# Patient Record
Sex: FEMALE | Race: WHITE
[De-identification: names, ages, dates, MRNs, and addresses within clinical notes are randomized per-mention and may not be internally consistent; named-entity substitution may affect disease eponyms.]

---

## 2021-07-20 ENCOUNTER — HOSPITAL ENCOUNTER (INPATIENT)
Dept: HOSPITAL 41 - JD.ED | Age: 71
LOS: 2 days | Discharge: HOME HEALTH | DRG: 422 | End: 2021-07-22
Payer: COMMERCIAL

## 2021-07-20 DIAGNOSIS — Z51.5: ICD-10-CM

## 2021-07-20 DIAGNOSIS — R62.7: ICD-10-CM

## 2021-07-20 DIAGNOSIS — C34.91: ICD-10-CM

## 2021-07-20 DIAGNOSIS — G89.3: ICD-10-CM

## 2021-07-20 DIAGNOSIS — M19.90: ICD-10-CM

## 2021-07-20 DIAGNOSIS — C78.7: ICD-10-CM

## 2021-07-20 DIAGNOSIS — E86.0: Primary | ICD-10-CM

## 2021-07-20 DIAGNOSIS — Z20.822: ICD-10-CM

## 2021-07-20 DIAGNOSIS — Z66: ICD-10-CM

## 2021-07-20 DIAGNOSIS — C79.51: ICD-10-CM

## 2021-07-20 DIAGNOSIS — Z87.891: ICD-10-CM

## 2021-07-20 DIAGNOSIS — C79.31: ICD-10-CM

## 2021-07-20 DIAGNOSIS — H54.7: ICD-10-CM

## 2021-07-20 PROCEDURE — U0002 COVID-19 LAB TEST NON-CDC: HCPCS

## 2021-07-20 NOTE — PCM.HP.2
H&P History of Present Illness





- General


Date of Service: 07/20/21


Admit Problem/Dx: 


                           Admission Diagnosis/Problem





Admission Diagnosis/Problem      physically weak, unable to ambulate, lung 

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  The patient is not continuing 

chemotherapy








Source of Information: Patient, Family


History Limitations: Reports: No Limitations





- History of Present Illness


Initial Comments - Free Text/Narative: 





The patient is a 70-year-old lady who had presented to the emergency department 

essentially unable to care for herself.  She has not eaten or drank anything in 

3 days.  She does have an underlying history of lung cancer that has 

metastasized to her brain, spine and liver.  The patient has reported that 

nothing tastes good for her.  The patient has a chemotherapy appointment 

tomorrow that she canceled as she does not want any further chemotherapy or 

radiation treatment.  The patient also says that she has been too weak to use 

her walker at home.  Reportedly the patient's brain tumors have grown.  The 

patient has stopped taking all of her medications.  The patient reports that she

has mild back pain


Onset of Symptoms: Reports: Gradual


Location: Reports: Back


Quality: Reports: Ache


Severity: Mild


Improves with: Reports: None


Worsens with: Reports: None


Context: Reports: Other (Poor oral intake of food and fluids)


Associated Symptoms: Reports: Loss of Appetite





- Related Data


Allergies/Adverse Reactions: 


                                    Allergies











Allergy/AdvReac Type Severity Reaction Status Date / Time


 


No Known Allergies Allergy   Verified 07/20/21 12:35











Home Medications: 


                                    Home Meds





Non-Formulary Medication [NF Drug] 0 each .XX ASDIRECTED 07/20/21 [History]











Past Medical History


HEENT History: Reports: Impaired Vision


Cardiovascular History: Reports: None


Respiratory History: Reports: SOB


Gastrointestinal History: Reports: None


OB/GYN History: Reports: Endometriosis, Polycystic Ovaries


Musculoskeletal History: Reports: Osteoarthritis, Other (See Below) (Back pain 

felt to be due to metastatic bone disease)


Neurological History: Reports: Other (See Below) (Has known multiple metastatic 

brain lesions from primary lung cancer.  No history of a seizure event)


Endocrine/Metabolic History: Reports: None


Hematologic History: Reports: None


Immunologic History: Reports: None


Oncologic (Cancer) History: Reports: Brain (Metastatic disease to her brain 

diagnosed within the last 9 months treated with radiation.), Liver, Lung 

(Primary cancer was diagnosed in 2017 in the right lung.  She is this has been 

treated with radiation and chemotherapy and immunotherapy.  Last treatment was 3

 weeks ago), Metastatic (Imaging reveals advanced metastatic disease to brain 

and bone and liver.)


Other Oncologic History: right lung cancer diagnosed in 2017 that has spread to 

brain and spine





- Past Surgical History


Respiratory Surgical History: Reports: Lung Biopsies


GI Surgical History: Reports: Colonoscopy





Social & Family History





- Tobacco Use


Tobacco Use Status *Q: Former Tobacco User


Used Tobacco, but Quit: Yes


Month/Year Tobacco Last Used: 01/1999





- Caffeine Use


Caffeine Use: Reports: None





- Recreational Drug Use


Recreational Drug Use: No





- Living Situation & Occupation


Living situation: Reports: , with Spouse


Occupation: Disabled





H&P Review of Systems





- Review of Systems:


Review Of Systems: See Below


General: Reports: Weakness, Fatigue, Decreased Appetite, Weight Loss


HEENT: Reports: Other (Dryness)


Pulmonary: Reports: Shortness of Breath


Cardiovascular: Reports: No Symptoms


Gastrointestinal: Reports: Anorexia


Genitourinary: Reports: No Symptoms


Musculoskeletal: Reports: Back Pain (Metastatic cancer to the spine)


Skin: Reports: Dryness


Psychiatric: Reports: No Symptoms


Neurological: Reports: No Symptoms


Hematologic/Lymphatic: Reports: No Symptoms


Immunologic: Reports: No Symptoms





Exam





- Exam


Exam: See Below





- Vital Signs


Vital Signs: 


                                Last Vital Signs











Temp  36.4 C   07/20/21 12:30


 


Pulse  108 H  07/20/21 12:30


 


Resp  18   07/20/21 12:30


 


BP  107/67   07/20/21 12:30


 


Pulse Ox  91 L  07/20/21 12:30











Weight: 69.853 kg





- Exam


Quality Assessment: No: Supplemental Oxygen, DVT Prophylaxis


General: Alert, Oriented, Cooperative


HEENT: Conjunctiva Clear, EACs Clear, PERRLA.  No: Mucosa Moist & Pink (Very 

dry, poor dentition)


Neck: Supple, Trachea Midline.  No: Lymphadenopathy


Lungs: Decreased Breath Sounds, Rales (Widely scattered)


Cardiovascular: Regular Rate, Regular Rhythm, Normal S1, Normal S2


GI/Abdominal Exam: Normal Bowel Sounds, Soft, Non-Tender, No Distention


 (Female) Exam: Deferred


Rectal (Female) Exam: Deferred


Back Exam: Normal Inspection, Full Range of Motion


Extremities: Normal Inspection, Normal Range of Motion, Pedal Edema (+1)


Skin: Warm, Dry, Intact


Neurological: Cranial Nerves Intact, Normal Speech


Psychiatric: Alert, Normal Affect, Normal Mood





- Patient Data


Lab Results Last 24 hrs: 


                         Laboratory Results - last 24 hr











  07/20/21 07/20/21 07/20/21 Range/Units





  12:55 12:55 12:55 


 


WBC  11.60 H    (3.98-10.04)  K/mm3


 


RBC  3.73 L    (3.98-5.22)  M/mm3


 


Hgb  11.9    (11.2-15.7)  gm/dl


 


Hct  37.6    (34.1-44.9)  %


 


MCV  100.8 H    (79.4-94.8)  fl


 


MCH  31.9    (25.6-32.2)  pg


 


MCHC  31.6 L    (32.2-35.5)  g/dl


 


RDW Std Deviation  64.2 H    (36.4-46.3)  fL


 


Plt Count  239    (182-369)  K/mm3


 


MPV  10.2    (9.4-12.3)  fl


 


Neut % (Auto)  70.7    (34.0-71.1)  %


 


Lymph % (Auto)  23.8    (19.3-51.7)  %


 


Mono % (Auto)  4.9    (4.7-12.5)  %


 


Eos % (Auto)  0 L    (0.7-5.8)  


 


Baso % (Auto)  0.3    (0.1-1.2)  %


 


Neut # (Auto)  8.20 H    (1.56-6.13)  K/mm3


 


Lymph # (Auto)  2.76    (1.18-3.74)  K/mm3


 


Mono # (Auto)  0.57 H    (0.24-0.36)  K/mm3


 


Eos # (Auto)  0.00 L    (0.04-0.36)  K/mm3


 


Baso # (Auto)  0.04    (0.01-0.08)  K/mm3


 


PT   16.0 H   (9.7-12.0)  SECONDS


 


INR   1.51   


 


Sodium    141  (136-145)  mEq/L


 


Potassium    3.6  (3.5-5.1)  mEq/L


 


Chloride    103  ()  mEq/L


 


Carbon Dioxide    22  (21-32)  mEq/L


 


Anion Gap    19.6 H  (5-15)  


 


BUN    12  (7-18)  mg/dL


 


Creatinine    0.6  (0.55-1.02)  mg/dL


 


Est Cr Clr Drug Dosing    80.75  mL/min


 


Estimated GFR (MDRD)    > 60  (>60)  mL/min


 


BUN/Creatinine Ratio    20.0 H  (14-18)  


 


Glucose    87  (70-99)  mg/dL


 


Calcium    8.4 L  (8.5-10.1)  mg/dL


 


Magnesium    1.7 L  (1.8-2.4)  mg/dL


 


Total Bilirubin    0.7  (0.2-1.0)  mg/dL


 


GGT    41  (5-55)  U/L


 


AST    21  (15-37)  U/L


 


ALT    12 L  (14-59)  U/L


 


Alkaline Phosphatase    103  ()  U/L


 


C-Reactive Protein    0.6  (<1.0)  mg/dL


 


NT-Pro-B Natriuret Pep     (0-125)  pg/mL


 


Total Protein    5.5 L  (6.4-8.2)  g/dl


 


Albumin    2.3 L  (3.4-5.0)  g/dl


 


Globulin    3.2  gm/dL


 


Albumin/Globulin Ratio    0.7 L  (1-2)  


 


Lipase    79  ()  U/L














  07/20/21 Range/Units





  12:55 


 


WBC   (3.98-10.04)  K/mm3


 


RBC   (3.98-5.22)  M/mm3


 


Hgb   (11.2-15.7)  gm/dl


 


Hct   (34.1-44.9)  %


 


MCV   (79.4-94.8)  fl


 


MCH   (25.6-32.2)  pg


 


MCHC   (32.2-35.5)  g/dl


 


RDW Std Deviation   (36.4-46.3)  fL


 


Plt Count   (182-369)  K/mm3


 


MPV   (9.4-12.3)  fl


 


Neut % (Auto)   (34.0-71.1)  %


 


Lymph % (Auto)   (19.3-51.7)  %


 


Mono % (Auto)   (4.7-12.5)  %


 


Eos % (Auto)   (0.7-5.8)  


 


Baso % (Auto)   (0.1-1.2)  %


 


Neut # (Auto)   (1.56-6.13)  K/mm3


 


Lymph # (Auto)   (1.18-3.74)  K/mm3


 


Mono # (Auto)   (0.24-0.36)  K/mm3


 


Eos # (Auto)   (0.04-0.36)  K/mm3


 


Baso # (Auto)   (0.01-0.08)  K/mm3


 


PT   (9.7-12.0)  SECONDS


 


INR   


 


Sodium   (136-145)  mEq/L


 


Potassium   (3.5-5.1)  mEq/L


 


Chloride   ()  mEq/L


 


Carbon Dioxide   (21-32)  mEq/L


 


Anion Gap   (5-15)  


 


BUN   (7-18)  mg/dL


 


Creatinine   (0.55-1.02)  mg/dL


 


Est Cr Clr Drug Dosing   mL/min


 


Estimated GFR (MDRD)   (>60)  mL/min


 


BUN/Creatinine Ratio   (14-18)  


 


Glucose   (70-99)  mg/dL


 


Calcium   (8.5-10.1)  mg/dL


 


Magnesium   (1.8-2.4)  mg/dL


 


Total Bilirubin   (0.2-1.0)  mg/dL


 


GGT   (5-55)  U/L


 


AST   (15-37)  U/L


 


ALT   (14-59)  U/L


 


Alkaline Phosphatase   ()  U/L


 


C-Reactive Protein   (<1.0)  mg/dL


 


NT-Pro-B Natriuret Pep  117  (0-125)  pg/mL


 


Total Protein   (6.4-8.2)  g/dl


 


Albumin   (3.4-5.0)  g/dl


 


Globulin   gm/dL


 


Albumin/Globulin Ratio   (1-2)  


 


Lipase   ()  U/L











Result Diagrams: 


                                 07/20/21 12:55





                                 07/20/21 12:55





Sepsis Event Note





- Evaluation


Sepsis Screening Result: No Definite Risk





- Focused Exam


Vital Signs: 


                                   Vital Signs











  Temp Pulse Resp BP Pulse Ox


 


 07/20/21 12:30  36.4 C  108 H  18  107/67  91 L














- Problem List


(1) Adult failure to thrive syndrome


SNOMED Code(s): 718746645


   ICD Code: R62.7 - ADULT FAILURE TO THRIVE   Status: Acute   Priority: High   

Current Visit: Yes   





(2) Metastatic cancer to bone


Status: Chronic   Priority: High   Current Visit: Yes   





(3) Metastatic cancer to brain


Status: Chronic   Priority: High   Current Visit: Yes   





(4) Metastatic cancer to liver


Status: Chronic   Priority: High   Current Visit: Yes   





(5) Chronic pain due to malignant neoplastic disease


SNOMED Code(s): 958389176


   ICD Code: G89.3 - NEOPLASM RELATED PAIN (ACUTE) (CHRONIC)   Status: Acute   

Priority: High   Current Visit: Yes   





(6) Dehydration


SNOMED Code(s): 13565162


   ICD Code: E86.0 - DEHYDRATION   Status: Acute   Priority: High   Current 

Visit: Yes   





(7) Anorexia


SNOMED Code(s): 23726196


   ICD Code: R63.0 - ANOREXIA   Status: Acute   Priority: High   Current Visit: 

Yes   


Problem List Initiated/Reviewed/Updated: Yes


Orders Last 24hrs: 


                               Active Orders 24 hr











 Category Date Time Status


 


 Patient Status [ADT] Routine ADT  07/20/21 13:58 Ordered


 


 EKG Documentation Completion [RC] STAT Care  07/20/21 12:52 Active


 


 Implanted Port Access [RC] Q12HR Care  07/20/21 12:50 Active


 


 Oxygen Therapy [RC] PRN Care  07/20/21 13:58 Ordered


 


 RT Aerosol Therapy [RC] ASDIRECTED Care  07/20/21 13:58 Ordered


 


 Up With Assistance [RC] ASDIRECTED Care  07/20/21 13:58 Ordered


 


 Vital Signs [RC] Q4H Care  07/20/21 13:58 Ordered


 


 Consult to Hospice [CONS] Routine Cons  07/20/21 13:58 Ordered


 


 Heart Healthy Diet [DIET] Diet  07/20/21 Lunch Ordered


 


 INR,PT,PROTHROMBIN TIME [COAG] Stat Lab  07/20/21 12:55 Results


 


 KETONES,BLOOD [CHEM] Stat Lab  07/20/21 12:55 Received


 


 LACTIC ACID [CHEM] Stat Lab  07/20/21 12:55 Received


 


 PTT,PARTIAL THROMBOPLSTIN TIME [COAG] Stat Lab  07/20/21 12:55 Results


 


 SEDIMENTATION RATE AUTO [HEME] Stat Lab  07/20/21 12:55 Received


 


 TROPONIN I [CHEM] Stat Lab  07/20/21 13:20 Ordered


 


 URINALYSIS W/MICROSCOPIC [UA W/MICROSCOPIC] [URIN] Stat Lab  07/20/21 12:53 

Ordered


 


 Acetaminophen [TylenoL] Med  07/20/21 13:58 Ordered





 650 mg PO Q4H PRN   


 


 Albuterol/Ipratropium [DuoNeb 3.0-0.5 MG/3 ML] Med  07/20/21 13:58 Ordered





 3 ml NEB Q4H PRN   


 


 Dextrose 5%-0.9% NaCl [Dextrose 5%-Normal Saline] 1,000 Med  07/20/21 13:00 

Active





 ml   





 IV ASDIRECTED   


 


 Docusate Sodium [Colace] Med  07/20/21 13:58 Ordered





 100 mg PO BID PRN   


 


 Enoxaparin [Lovenox] Med  07/21/21 09:00 Ordered





 40 mg SUBCUT DAILY   


 


 Lactated Ringers @ 100 MLS/HR(1000ml Bag) Med  07/20/21 14:00 Ordered





 Lactated Ringers [Ringers, Lactated] 1,000 ml   





 IV ASDIRECTED   


 


 Ondansetron [Zofran ODT] Med  07/20/21 13:58 Ordered





 4 mg PO Q4H PRN   


 


 Temazepam [Restoril] Med  07/20/21 13:58 Ordered





 15 mg PO BEDTIME PRN   


 


 oxyCODONE Med  07/20/21 13:58 Ordered





 5 mg PO Q4H PRN   


 


 Resuscitation Status Routine Resus Stat  07/20/21 13:58 Ordered








                                Medication Orders





Dextrose/Sodium Chloride (Dextrose 5%-Normal Saline)  1,000 mls @ 999 mls/hr IV 

ASDIRECTED SCARLETT


   Last Admin: 07/20/21 13:17  Dose: 999 mls/hr


   Documented by: ANAY








Assessment/Plan Comment:: 





The patient is a 70-year-old lady who has been admitted to inpatient under DO 

NOT INTUBATE/DO NOT RESUSCITATE comfort measures only category. She has since 

really has end-stage cancer. The patient will be rehydrated as she is 

hypovolemic. The patient will be fluid resuscitated with the use of Ringer's 

lactate at 100 mL/h. The patient's pain will be controlled with the use of 

narcotic pain medications as necessary. I have also ordered Ativan to help with 

anxiety. No further testing will be ordered. Hospice has been consulted. I also 

had a long discussion with the patient and the patient's  regarding 

hospice and palliative care. Overall the patient's prognosis is poor.





- Mortality Measure


Prognosis:: Poor

## 2021-07-20 NOTE — EDM.PDOC
ED HPI GENERAL MEDICAL PROBLEM





- General


Chief Complaint: General


Stated Complaint: TIRED/WEAK


Time Seen by Provider: 07/20/21 12:35


Source of Information: Reports: Patient, Family (son)


History Limitations: Reports: No Limitations





- History of Present Illness


INITIAL COMMENTS - FREE TEXT/NARRATIVE: 





 70year-old female presents to the ED in the accompaniment of family member.  S

he is so weak she required assistance from her vehicle by nursing staff.  She 

presents due to generalized weakness and inability to eat for several days.  She

states she has a primary right sided lung cancer that was diagnosed in 2017 

treated with radiation and chemotherapy.  Subsequently she has developed 

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  She has not been able 

to eat anything for the last 3 days.  Nothing tastes good.  Occasional bouts of 

nausea without vomiting.  Stools are still formed and somewhat difficult to pass

as they are quite hard.  She estimates she is lost 45 pounds of weight in the 

last 3 months.  She has tried immunotherapy with no improvement in symptoms.  

She did have recent imaging with MRI of her brain revealing tumors bilaterally. 

She has a scheduled appointment to see Dr. Chirstopher her oncologist tomorrow in 

[place] but was just going to simply tell him that she does not wish to pursue 

any active chemotherapy.  She essentially needs to be placed in hospice care.  

She states she is so weak she cannot walk with the aid of her walker today.


Onset: Gradual, Other (Primary diagnosis of right-sided lung cancer in 2017.  

More gradual loss of function with weight loss over the last 3 months.)


Duration: Chronic


Location: Reports: Chest (Primary right sided lung cancer with known metastatic 

disease to bone and brain and likely liver.  Complete loss of appetite), 

Generalized (Her generalized weakness in it unable to walk even with the aid of 

her walker today.)


Quality: Reports: Other


Severity: Severe (Generalized weakness and loss of appetite)


Improves with: Reports: None


Worsens with: Reports: None


Context: Reports: Other (Primary cancer diagnosed in 2017 in the right lung with

palliative radiotherapy and chemotherapy.  Subsequently has developed metastatic

disease to brain which has been radiated and more recently diffuse metastatic 

disease to bones and liver).  Denies: Activity, Exercise, Lifting, Sick Contact,

Trauma


Associated Symptoms: Reports: Cough, Loss of Appetite, Malaise (Rare cough rare 

sputum production no hemoptysis), Nausea/Vomiting, Shortness of Breath, Weakness

(Neurolysed weakness to the point she is unable to walk even with the aid of her

walker today.  She cannot get dressed on her own.), Other (Mild constipation.). 

Denies: Confusion, Chest Pain, Diaphoresis, Fever/Chills, Headaches (Pleat loss 

of appetite.  Has not eaten anything in 3 days.), Rash, Seizure (Occasional 

nausea but no vomiting), Syncope


Treatments PTA: Reports: Acetaminophen (Rare use of Tylenol)





- Related Data


                                    Allergies











Allergy/AdvReac Type Severity Reaction Status Date / Time


 


No Known Allergies Allergy   Verified 07/20/21 12:35











Home Meds: 


                                    Home Meds





Non-Formulary Medication [NF Drug] 0 each .XX ASDIRECTED 07/20/21 [History]











Past Medical History


HEENT History: Reports: Impaired Vision


Respiratory History: Reports: SOB


OB/GYN History: Reports: Endometriosis, Polycystic Ovaries


Musculoskeletal History: Reports: Osteoarthritis, Other (See Below) (Back pain 

felt to be due to metastatic bone disease)


Neurological History: Reports: Other (See Below) (Has known multiple metastatic 

brain lesions from primary lung cancer.  No history of a seizure event)


Oncologic (Cancer) History: Reports: Brain (Metastatic disease to her brain 

diagnosed within the last 9 months treated with radiation.), Lung (Primary 

cancer was diagnosed in 2017 in the right lung.  She is this has been treated 

with radiation and chemotherapy and immunotherapy.  Last treatment was 3 weeks 

ago), Metastatic (Imaging reveals advanced metastatic disease to brain and bone 

and liver.)


Other Oncologic History: right lung cancer diagnosed in 2017 that has spread to 

brain and spine





- Past Surgical History


Respiratory Surgical History: Reports: Lung Biopsies


GI Surgical History: Reports: Colonoscopy





Social & Family History





- Tobacco Use


Tobacco Use Status *Q: Former Tobacco User


Used Tobacco, but Quit: Yes


Month/Year Tobacco Last Used: 01/1999





- Caffeine Use


Caffeine Use: Reports: None





- Recreational Drug Use


Recreational Drug Use: No





- Living Situation & Occupation


Occupation: Disabled





ED ROS GENERAL





- Review of Systems


Review Of Systems: See Below


Constitutional: Reports: Malaise, Weakness, Fatigue, Decreased Appetite 

(Complete loss of appetite with very little to eat or drink in the last 3 

days.), Weight Loss (A 5 pound weight loss in the last 3 months).  Denies: 

Fever, Chills


HEENT: Reports: Glasses


Respiratory: Reports: Shortness of Breath, Wheezing, Cough (Occasional cough).  

Denies: Pleuritic Chest Pain (Occasional wheezing), Hemoptysis ( with rare 

sputum production)


Cardiovascular: Reports: Blood Pressure Problem, Dyspnea on Exertion (Chronic 

edema both lower extremities), Edema, Lightheadedness (Often gets lightheaded 

dizzy when she stands from a seated or lying down position).  Denies: Chest 

Pain, Claudication (Usually on the low side), Orthopnea, Palpitations, PND


Endocrine: Reports: Fatigue


GI/Abdominal: Reports: Constipation (Stools are hard and difficult to pass), 

Decreased Appetite, Nausea (Occasional nausea).  Denies: Hematochezia, Melena


: Reports: Other (Urine is dark and jason in color.)


Musculoskeletal: Reports: Neck Pain, Back Pain


Skin: Reports: Bruising (Loses fairly easily.)


Neurological: Reports: Dizziness, Difficulty Walking (Uses a walker but today 

not able to walk with even with her walker.), Weakness.  Denies: Confusion, 

Headache, Numbness, Syncope, Tingling, Tremors, Trouble Speaking (  2-week), 

Change in Speech


Psychiatric: Reports: Depression (Due to chronic illness.)


Hematologic/Lymphatic: Reports: Anemia


Immunologic: Reports: No Symptoms





ED EXAM, GENERAL





- Physical Exam


Exam: See Below


Exam Limited By: No Limitations


General Appearance: Alert, WD/WN, Mild Distress, Other (Patient is obviously 

quite ill.  Strong smell of ketones on her breath.  Temperature is 36.4 heart 

rate 112 and sinus at the bedside.  Respiratory rate of 18 with O2 sats of 91% 

room air hands are slightly cool to touch.  Blood pressure 107/67.)


Eye Exam: Bilateral Eye: Normal Inspection (Mild blepharal pallor bilaterally no

 scleral icterus), PERRL


Throat/Mouth: Normal Inspection, Normal Oropharynx, Other


Head: Atraumatic (Tongue is dry and coated.), Normocephalic


Neck: Normal Inspection, Supple, Limited Range of Motion, Tender Lateral.  No: 

Full Range of Motion, Carotid Bruit, Lymphadenopathy (L), Lymphadenopathy (R)


Respiratory/Chest: No Respiratory Distress, No Accessory Muscle Use, Decreased 

Breath Sounds (Decreased air entry the posterior right lung field without 

adventitial sounds.  Question whether may be a small pleural effusion with 

dullness to percussion in this area.), Other (Port-A-Cath left upper anterior 

chest)


Cardiovascular: Normal Peripheral Pulses, No Gallop (Sinus tachycardia at the 

bedside 112/min.), No Murmur, No Rub, Tachycardia.  No: No Edema


Peripheral Pulses: 1+: Posterior Tibial (L) (Called to feel pulses in her feet 

due to edema.), Posterior Tibial (R), Dorsalis Pedis (L), Dorsalis Pedis (R), 

2+: Carotid (L), Carotid (R)


GI/Abdominal: Normal Bowel Sounds, Soft, Non-Tender, No Organomegaly, No Mass, 

Pelvis Stable, Other (Is in the right upper quadrant of the abdomen in the 

distribution of the liver without obvious hepatomegaly.  No surgical scars)


Back Exam: Normal Inspection.  No: CVA Tenderness (L), CVA Tenderness (R)


Extremities: Pedal Edema (2+ pitting edema up to mid tib-fib bilaterally.), 

Other (No significant osteoarthritic changes in knees and hips.)


Neurological: Alert, Oriented, CN II-XII Intact, Normal Cognition.  No: Normal 

Gait (And assessed but we had to help her out of the car to get into a 

wheelchair to get into the ED.  Normally walks with the aid of a walker but is 

unable to do so due to weakness.)


Skin Exam: Warm, Dry, Intact, No Rash (Mild pallor), Pallor


  ** #1 Interpretation


EKG Date: 07/20/21


Time: 13:02


Rhythm: Other (Sinus tachycardia)


Rate (Beats/Min): 103


Axis: LAD-Left Axis Deviation (-44 degrees)


P-Wave: Present


QRS: Other (There is Q-wave in V1 and poor R wave progression V2 V3 consider old

 anteroseptal myocardial infarction.  There is Q-wave in lead III and aVF 

compared with old inferior wall myocardial infarction.  There is decreased 

voltage in both the limb and precordial leads.)


ST-T: Other (T wave flattening in aVL nonspecific finding)


QT: Normal


EKG Interpretation Comments: 





Abnormal ECG





Course





- Vital Signs


Last Recorded V/S: 


                                Last Vital Signs











Temp  36.4 C   07/20/21 12:30


 


Pulse  108 H  07/20/21 12:30


 


Resp  18   07/20/21 12:30


 


BP  107/67   07/20/21 12:30


 


Pulse Ox  91 L  07/20/21 12:30














- Orders/Labs/Meds


Orders: 


                               Active Orders 24 hr











 Category Date Time Status


 


 Patient Status [ADT] Routine ADT  07/20/21 13:58 Active


 


 EKG Documentation Completion [RC] STAT Care  07/20/21 12:52 Active


 


 Implanted Port Access [RC] Q12HR Care  07/20/21 12:50 Active


 


 Oxygen Therapy [RC] PRN Care  07/20/21 13:58 Active


 


 RT Aerosol Therapy [RC] ASDIRECTED Care  07/20/21 13:58 Active


 


 Up With Assistance [RC] ASDIRECTED Care  07/20/21 13:58 Active


 


 Vital Signs [RC] Q4H Care  07/20/21 13:58 Active


 


 Consult to Hospice [CONS] Routine Cons  07/20/21 13:58 Ordered


 


 Heart Healthy Diet [DIET] Diet  07/20/21 Lunch Active


 


 CORONAVIRUS COVID-19 GINNA [MOLEC] Stat Lab  07/20/21 13:55 Received


 


 SEDIMENTATION RATE AUTO [HEME] Stat Lab  07/20/21 12:55 Received


 


 URINALYSIS W/MICROSCOPIC [UA W/MICROSCOPIC] [URIN] Stat Lab  07/20/21 12:53 

Ordered


 


 Acetaminophen [TylenoL] Med  07/20/21 13:58 Active





 650 mg PO Q4H PRN   


 


 Albuterol/Ipratropium [DuoNeb 3.0-0.5 MG/3 ML] Med  07/20/21 13:58 Active





 3 ml NEB Q4H PRN   


 


 Docusate Sodium [Colace] Med  07/20/21 13:58 Active





 100 mg PO BID PRN   


 


 Enoxaparin [Lovenox] Med  07/21/21 09:00 Active





 40 mg SUBCUT DAILY   


 


 Lactated Ringers [Ringers, Lactated] 1,000 ml Med  07/20/21 14:00 Active





 IV ASDIRECTED   


 


 Ondansetron [Zofran ODT] Med  07/20/21 13:58 Active





 4 mg PO Q4H PRN   


 


 Temazepam [Restoril] Med  07/20/21 21:00 Active





 15 mg PO BEDTIME PRN   


 


 oxyCODONE Med  07/20/21 13:58 Active





 5 mg PO Q4H PRN   


 


 Resuscitation Status Routine Resus Stat  07/20/21 13:58 Ordered








                                Medication Orders





Acetaminophen (Acetaminophen 325 Mg Tab)  650 mg PO Q4H PRN


   PRN Reason: Pain (Mild 1-3)/fever


Albuterol/Ipratropium (Albuterol/Ipratropium 3.0-0.5 Mg/3 Ml Neb Soln)  3 ml NEB

 Q4H PRN


   PRN Reason: Shortness Of Breath/wheezing


Docusate Sodium (Docusate Sodium 100 Mg Cap)  100 mg PO BID PRN


   PRN Reason: Constipation


Enoxaparin Sodium (Enoxaparin 40 Mg/0.4 Ml Syringe)  40 mg SUBCUT DAILY SCARLETT


Lactated Ringer's (Ringers, Lactated)  1,000 mls @ 100 mls/hr IV ASDIRECTED SCARLETT


Ondansetron HCl (Ondansetron 4 Mg Tab.Dis)  4 mg PO Q4H PRN


   PRN Reason: nausea, able to take PO


Oxycodone HCl (Oxycodone 5 Mg Tab)  5 mg PO Q4H PRN


   PRN Reason: Pain (moderate 4-6)


Temazepam (Temazepam 15 Mg Cap)  15 mg PO BEDTIME PRN


   PRN Reason: Sleep








Labs: 


                                Laboratory Tests











  07/20/21 07/20/21 07/20/21 Range/Units





  12:55 12:55 12:55 


 


WBC  11.60 H    (3.98-10.04)  K/mm3


 


RBC  3.73 L    (3.98-5.22)  M/mm3


 


Hgb  11.9    (11.2-15.7)  gm/dl


 


Hct  37.6    (34.1-44.9)  %


 


MCV  100.8 H    (79.4-94.8)  fl


 


MCH  31.9    (25.6-32.2)  pg


 


MCHC  31.6 L    (32.2-35.5)  g/dl


 


RDW Std Deviation  64.2 H    (36.4-46.3)  fL


 


Plt Count  239    (182-369)  K/mm3


 


MPV  10.2    (9.4-12.3)  fl


 


Neut % (Auto)  70.7    (34.0-71.1)  %


 


Lymph % (Auto)  23.8    (19.3-51.7)  %


 


Mono % (Auto)  4.9    (4.7-12.5)  %


 


Eos % (Auto)  0 L    (0.7-5.8)  


 


Baso % (Auto)  0.3    (0.1-1.2)  %


 


Neut # (Auto)  8.20 H    (1.56-6.13)  K/mm3


 


Lymph # (Auto)  2.76    (1.18-3.74)  K/mm3


 


Mono # (Auto)  0.57 H    (0.24-0.36)  K/mm3


 


Eos # (Auto)  0.00 L    (0.04-0.36)  K/mm3


 


Baso # (Auto)  0.04    (0.01-0.08)  K/mm3


 


PT   16.0 H   (9.7-12.0)  SECONDS


 


INR   1.51   


 


APTT   32.0 H   (21.7-31.4)  SECONDS


 


Sodium    141  (136-145)  mEq/L


 


Potassium    3.6  (3.5-5.1)  mEq/L


 


Chloride    103  ()  mEq/L


 


Carbon Dioxide    22  (21-32)  mEq/L


 


Anion Gap    19.6 H  (5-15)  


 


BUN    12  (7-18)  mg/dL


 


Creatinine    0.6  (0.55-1.02)  mg/dL


 


Est Cr Clr Drug Dosing    80.75  mL/min


 


Estimated GFR (MDRD)    > 60  (>60)  mL/min


 


BUN/Creatinine Ratio    20.0 H  (14-18)  


 


Glucose    87  (70-99)  mg/dL


 


Lactic Acid     (0.4-2.0)  mmol/L


 


Calcium    8.4 L  (8.5-10.1)  mg/dL


 


Magnesium    1.7 L  (1.8-2.4)  mg/dL


 


Total Bilirubin    0.7  (0.2-1.0)  mg/dL


 


GGT    41  (5-55)  U/L


 


AST    21  (15-37)  U/L


 


ALT    12 L  (14-59)  U/L


 


Alkaline Phosphatase    103  ()  U/L


 


Troponin I     (0.00-0.056)  ng/mL


 


C-Reactive Protein    0.6  (<1.0)  mg/dL


 


NT-Pro-B Natriuret Pep     (0-125)  pg/mL


 


Total Protein    5.5 L  (6.4-8.2)  g/dl


 


Albumin    2.3 L  (3.4-5.0)  g/dl


 


Globulin    3.2  gm/dL


 


Albumin/Globulin Ratio    0.7 L  (1-2)  


 


Lipase    79  ()  U/L


 


Ketones     (0.0-0.3)  mM














  07/20/21 07/20/21 07/20/21 Range/Units





  12:55 12:55 12:55 


 


WBC     (3.98-10.04)  K/mm3


 


RBC     (3.98-5.22)  M/mm3


 


Hgb     (11.2-15.7)  gm/dl


 


Hct     (34.1-44.9)  %


 


MCV     (79.4-94.8)  fl


 


MCH     (25.6-32.2)  pg


 


MCHC     (32.2-35.5)  g/dl


 


RDW Std Deviation     (36.4-46.3)  fL


 


Plt Count     (182-369)  K/mm3


 


MPV     (9.4-12.3)  fl


 


Neut % (Auto)     (34.0-71.1)  %


 


Lymph % (Auto)     (19.3-51.7)  %


 


Mono % (Auto)     (4.7-12.5)  %


 


Eos % (Auto)     (0.7-5.8)  


 


Baso % (Auto)     (0.1-1.2)  %


 


Neut # (Auto)     (1.56-6.13)  K/mm3


 


Lymph # (Auto)     (1.18-3.74)  K/mm3


 


Mono # (Auto)     (0.24-0.36)  K/mm3


 


Eos # (Auto)     (0.04-0.36)  K/mm3


 


Baso # (Auto)     (0.01-0.08)  K/mm3


 


PT     (9.7-12.0)  SECONDS


 


INR     


 


APTT     (21.7-31.4)  SECONDS


 


Sodium     (136-145)  mEq/L


 


Potassium     (3.5-5.1)  mEq/L


 


Chloride     ()  mEq/L


 


Carbon Dioxide     (21-32)  mEq/L


 


Anion Gap     (5-15)  


 


BUN     (7-18)  mg/dL


 


Creatinine     (0.55-1.02)  mg/dL


 


Est Cr Clr Drug Dosing     mL/min


 


Estimated GFR (MDRD)     (>60)  mL/min


 


BUN/Creatinine Ratio     (14-18)  


 


Glucose     (70-99)  mg/dL


 


Lactic Acid   0.8   (0.4-2.0)  mmol/L


 


Calcium     (8.5-10.1)  mg/dL


 


Magnesium     (1.8-2.4)  mg/dL


 


Total Bilirubin     (0.2-1.0)  mg/dL


 


GGT     (5-55)  U/L


 


AST     (15-37)  U/L


 


ALT     (14-59)  U/L


 


Alkaline Phosphatase     ()  U/L


 


Troponin I     (0.00-0.056)  ng/mL


 


C-Reactive Protein     (<1.0)  mg/dL


 


NT-Pro-B Natriuret Pep  117    (0-125)  pg/mL


 


Total Protein     (6.4-8.2)  g/dl


 


Albumin     (3.4-5.0)  g/dl


 


Globulin     gm/dL


 


Albumin/Globulin Ratio     (1-2)  


 


Lipase     ()  U/L


 


Ketones    5.5  (0.0-0.3)  mM














  07/20/21 Range/Units





  12:55 


 


WBC   (3.98-10.04)  K/mm3


 


RBC   (3.98-5.22)  M/mm3


 


Hgb   (11.2-15.7)  gm/dl


 


Hct   (34.1-44.9)  %


 


MCV   (79.4-94.8)  fl


 


MCH   (25.6-32.2)  pg


 


MCHC   (32.2-35.5)  g/dl


 


RDW Std Deviation   (36.4-46.3)  fL


 


Plt Count   (182-369)  K/mm3


 


MPV   (9.4-12.3)  fl


 


Neut % (Auto)   (34.0-71.1)  %


 


Lymph % (Auto)   (19.3-51.7)  %


 


Mono % (Auto)   (4.7-12.5)  %


 


Eos % (Auto)   (0.7-5.8)  


 


Baso % (Auto)   (0.1-1.2)  %


 


Neut # (Auto)   (1.56-6.13)  K/mm3


 


Lymph # (Auto)   (1.18-3.74)  K/mm3


 


Mono # (Auto)   (0.24-0.36)  K/mm3


 


Eos # (Auto)   (0.04-0.36)  K/mm3


 


Baso # (Auto)   (0.01-0.08)  K/mm3


 


PT   (9.7-12.0)  SECONDS


 


INR   


 


APTT   (21.7-31.4)  SECONDS


 


Sodium   (136-145)  mEq/L


 


Potassium   (3.5-5.1)  mEq/L


 


Chloride   ()  mEq/L


 


Carbon Dioxide   (21-32)  mEq/L


 


Anion Gap   (5-15)  


 


BUN   (7-18)  mg/dL


 


Creatinine   (0.55-1.02)  mg/dL


 


Est Cr Clr Drug Dosing   mL/min


 


Estimated GFR (MDRD)   (>60)  mL/min


 


BUN/Creatinine Ratio   (14-18)  


 


Glucose   (70-99)  mg/dL


 


Lactic Acid   (0.4-2.0)  mmol/L


 


Calcium   (8.5-10.1)  mg/dL


 


Magnesium   (1.8-2.4)  mg/dL


 


Total Bilirubin   (0.2-1.0)  mg/dL


 


GGT   (5-55)  U/L


 


AST   (15-37)  U/L


 


ALT   (14-59)  U/L


 


Alkaline Phosphatase   ()  U/L


 


Troponin I  < 0.017  (0.00-0.056)  ng/mL


 


C-Reactive Protein   (<1.0)  mg/dL


 


NT-Pro-B Natriuret Pep   (0-125)  pg/mL


 


Total Protein   (6.4-8.2)  g/dl


 


Albumin   (3.4-5.0)  g/dl


 


Globulin   gm/dL


 


Albumin/Globulin Ratio   (1-2)  


 


Lipase   ()  U/L


 


Ketones   (0.0-0.3)  mM











Meds: 


Medications











Generic Name Dose Route Start Last Admin





  Trade Name Freq  PRN Reason Stop Dose Admin


 


Acetaminophen  650 mg  07/20/21 13:58 





  Acetaminophen 325 Mg Tab  PO  





  Q4H PRN  





  Pain (Mild 1-3)/fever  


 


Albuterol/Ipratropium  3 ml  07/20/21 13:58 





  Albuterol/Ipratropium 3.0-0.5 Mg/3 Ml Neb Soln  NEB  





  Q4H PRN  





  Shortness Of Breath/wheezing  


 


Docusate Sodium  100 mg  07/20/21 13:58 





  Docusate Sodium 100 Mg Cap  PO  





  BID PRN  





  Constipation  


 


Enoxaparin Sodium  40 mg  07/21/21 09:00 





  Enoxaparin 40 Mg/0.4 Ml Syringe  SUBCUT  





  DAILY [facility]  


 


Lactated Ringer's  1,000 mls @ 100 mls/hr  07/20/21 14:00 





  Ringers, Lactated  IV  





  ASDIRECTED SCARLETT  


 


Ondansetron HCl  4 mg  07/20/21 13:58 





  Ondansetron 4 Mg Tab.Dis  PO  





  Q4H PRN  





  nausea, able to take PO  


 


Oxycodone HCl  5 mg  07/20/21 13:58 





  Oxycodone 5 Mg Tab  PO  





  Q4H PRN  





  Pain (moderate 4-6)  


 


Temazepam  15 mg  07/20/21 21:00 





  Temazepam 15 Mg Cap  PO  





  BEDTIME PRN  





  Sleep  














Discontinued Medications














Generic Name Dose Route Start Last Admin





  Trade Name Freq  PRN Reason Stop Dose Admin


 


Dextrose/Sodium Chloride  1,000 mls @ 999 mls/hr  07/20/21 13:00  07/20/21 13:17





  Dextrose 5%-Normal Saline  IV   999 mls/hr





  ASDIRECTED SCARLETT   Administration


 


Ondansetron HCl  4 mg  07/20/21 12:50  07/20/21 13:17





  Ondansetron 4 Mg/2 Ml Sdv  IVPUSH  07/20/21 12:51  4 mg





  ONETIME ONE   Administration














- Radiology Interpretation


Free Text/Narrative:: 


70-year-old female presents to the ED requiring assist from her vehicle by 

nursing staff and her son.  She has a primary history of lung cancer on the 

right side diagnosed in 2017 treated with radiation and chemotherapy.  Did okay 

for period of time but recently identified to have metastatic disease to her 

brain treated with radiotherapy and now diffuse bony metastatic disease 

indicating chemotherapy failure and immunotherapy failure.  She is also had 

primary radiation to her right lung.  She states nothing tastes good unable to 

eat or drink hardly anything in the last 3 days.  Reports a 45 pound weight loss

 in the last 3 months.  Usually can get along walking with her walker but has 

been unable to do so for the last few days.  She states she is so weak she 

cannot dress herself.  On exam breath smells strongly of ketones.  She is alert 

oriented and answers all questions appropriately.  Exam reveals mildly decreased

 air entry at posterior right lung field.  Mild dependent edema bilaterally.  

Plan routine labs including lactic acid and serum ketones to be obtained.  She 

is clinically volume depleted.  IV will be D5 normal saline at open.  She does 

have a Port-A-Cath upper anterior left chest.  It appears that she will require 

admission to the hospital.  She will have to consider hospice/palliative care.








- Re-Assessments/Exams


Free Text/Narrative Re-Assessment/Exam: 





07/20/21 13:20 ECG is suggestive of an old anteroseptal and inferior wall 

myocardial infarction.  For this reason a serum troponin will be ordered.





07/20/21 13:45 chest x-ray done portably reveals patchy increased density within

 the right lung base.  Questionable pleural effusion is seen with blunting of 

the right costophrenic angle.  Lungs otherwise are grossly clear.  Heart size 

and mediastinum are normal.  Degenerative change appears to be present within 

the left shoulder.  Left-sided infusion port is seen.  Scoliosis of the thoracic

 spine noted.





07/20/21 13:47 White count is mildly elevated at 11.60.  The differential r

eveals 71% neutrophils on the auto differential.  Hemoglobin is slightly low at 

11.9 with hematocrit of 37.6.  MCV is 100.8.  Platelet count is normal at 

239,000.  Serum sodium is 141 with a potassium of 3.6.  Chloride 103 with a 

bicarb of 22.  Anion gap is elevated at 19.6.  BUN is 12 with a creatinine of 

0.6 and a GFR greater than 60.  Glucose is 87.  Calcium is 8.4.  Magnesium 

slightly low at 1.7.  Total bilirubin is 0.7.  GGT is 41.  Transaminases are 

normal as is the alkaline phosphatase.  C-reactive protein is 0.6.  BNP is 117. 

 Total protein slightly low at 5.5 with a low albumin fraction of 2.3.  Serum 

lipase is normal at 79.  I have discussed the case with on-call hospitalist Dr. Cipriano Gacria and he will see the patient in the emergency room.





07/20/21 14:16 PT is 16.0 with an INR elevated at 1.51.  PTT is pending.  This 

indicates that she is auto anticoagulated likely due to metastatic disease to 

her liver.  Serum ketones are not yet available





07/20/21 14:55 Lactic acid is 0.8. Serum ketones are markedly elevated at 5.5. 

C-reactive protein is 0.6. Patient will be started on second liter of IV 

replacement therapy IV Ringer's lactate








Departure





- Departure


Time of Disposition: 14:55


Disposition: Admitted As Inpatient 68


Condition: Poor


Clinical Impression: 


 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

 cancer to brain, Metastatic cancer to liver, Chronic pain due to malignant 

neoplastic disease, Dehydration








- Discharge Information


*PRESCRIPTION DRUG MONITORING PROGRAM REVIEWED*: Not Applicable


*COPY OF PRESCRIPTION DRUG MONITORING REPORT IN PATIENT ELOINA: Not Applicable


Referrals: 


Fritz Kaur MD [Primary Care Provider] - 


Forms:  ED Department Discharge





Sepsis Event Note (ED)





- Evaluation


Sepsis Screening Result: No Definite Risk





- Focused Exam


Vital Signs: 


                                   Vital Signs











  Temp Pulse Resp BP Pulse Ox


 


 07/20/21 12:30  36.4 C  108 H  18  107/67  91 L














- My Orders


Last 24 Hours: 


My Active Orders





07/20/21 12:50


Implanted Port Access [RC] [id number] 





07/20/21 12:52


EKG Documentation Completion [RC] STAT 





07/20/21 12:53


URINALYSIS W/MICROSCOPIC [UA W/MICROSCOPIC] [URIN] Stat 





07/20/21 12:55


SEDIMENTATION RATE AUTO [HEME] Stat 





07/20/21 13:55


CORONAVIRUS COVID-19 GINNA [MOLEC] Stat 














- Assessment/Plan


Last 24 Hours: 


My Active Orders





07/20/21 12:50


Implanted Port Access [RC] Q12HR 





07/20/21 12:52


EKG Documentation Completion [RC] STAT 





07/20/21 12:53


URINALYSIS W/MICROSCOPIC [UA W/MICROSCOPIC] [URIN] Stat 





07/20/21 12:55


SEDIMENTATION RATE AUTO [HEME] Stat 





07/20/21 13:55


CORONAVIRUS COVID-19 GINNA [MOLEC] Stat

## 2021-07-20 NOTE — CR
Chest: Portable view of the chest was obtained.

 

Comparison: No previous study is available.

 

Patchy increased density within the right lung base is seen.  

Questionable pleural effusion is seen with blunting of the right 

lateral costophrenic angle.  Lungs otherwise are grossly clear.  Heart

 size and mediastinum are normal.  Degenerative change appears to be 

present within the left shoulder.  Left-sided infusion port is seen.  

Scoliosis is noted within the spine.

 

Impression:

1.  Findings within the right base.  Uncertain if this represents 

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 

represent an area of pneumonia with pleural effusion.

2.  Other findings as noted above which appear to be chronic.

 

Diagnostic code #3

## 2021-07-21 NOTE — PCM.PN
- General Info


Date of Service: 07/21/21


Admission Dx/Problem (Free Text): 


                           Admission Diagnosis/Problem





Admission Diagnosis/Problem      physically weak, unable to ambulate, lung 

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  The patient is not continuing 

chemotherapy








Subjective Update: 





The patient is a 70-year-old lady who was admitted yesterday secondary to 

dehydration and fatigue and weakness associated with chemotherapy.  She has 

history of lung cancer which is metastatic to the bones, brain and liver.  The 

patient says that she is doing better today.  She has been trying to eat some.  

Her pain is being controlled.


Functional Status: Reports: Pain Controlled.  Denies: Tolerating Diet





- Review of Systems


General: Reports: Weakness, Fatigue, Malaise


HEENT: Reports: No Symptoms


Pulmonary: Reports: No Symptoms


Cardiovascular: Reports: No Symptoms


Gastrointestinal: Reports: No Symptoms


Genitourinary: Reports: No Symptoms


Musculoskeletal: Reports: No Symptoms


Skin: Reports: No Symptoms


Neurological: Reports: No Symptoms


Psychiatric: Reports: No Symptoms





- Patient Data


Vitals - Most Recent: 


                                Last Vital Signs











Temp  36.4 C   07/21/21 06:04


 


Pulse  92   07/21/21 06:04


 


Resp  16   07/21/21 06:04


 


BP  109/69   07/21/21 06:04


 


Pulse Ox  94 L  07/21/21 06:04











Weight - Most Recent: 69.944 kg


I&O - Last 24 Hours: 


                                 Intake & Output











 07/20/21 07/21/21 07/21/21





 22:59 06:59 14:59


 


Intake Total  1652 


 


Balance  1652 











Lab Results Last 24 Hours: 


                         Laboratory Results - last 24 hr











  07/20/21 07/20/21 07/20/21 Range/Units





  12:55 12:55 12:55 


 


WBC  11.60 H    (3.98-10.04)  K/mm3


 


RBC  3.73 L    (3.98-5.22)  M/mm3


 


Hgb  11.9    (11.2-15.7)  gm/dl


 


Hct  37.6    (34.1-44.9)  %


 


MCV  100.8 H    (79.4-94.8)  fl


 


MCH  31.9    (25.6-32.2)  pg


 


MCHC  31.6 L    (32.2-35.5)  g/dl


 


RDW Std Deviation  64.2 H    (36.4-46.3)  fL


 


Plt Count  239    (182-369)  K/mm3


 


MPV  10.2    (9.4-12.3)  fl


 


Neut % (Auto)  70.7    (34.0-71.1)  %


 


Lymph % (Auto)  23.8    (19.3-51.7)  %


 


Mono % (Auto)  4.9    (4.7-12.5)  %


 


Eos % (Auto)  0 L    (0.7-5.8)  


 


Baso % (Auto)  0.3    (0.1-1.2)  %


 


Neut # (Auto)  8.20 H    (1.56-6.13)  K/mm3


 


Lymph # (Auto)  2.76    (1.18-3.74)  K/mm3


 


Mono # (Auto)  0.57 H    (0.24-0.36)  K/mm3


 


Eos # (Auto)  0.00 L    (0.04-0.36)  K/mm3


 


Baso # (Auto)  0.04    (0.01-0.08)  K/mm3


 


ESR     (0-20)  mm/hr


 


PT   16.0 H   (9.7-12.0)  SECONDS


 


INR   1.51   


 


APTT   32.0 H   (21.7-31.4)  SECONDS


 


Sodium    141  (136-145)  mEq/L


 


Potassium    3.6  (3.5-5.1)  mEq/L


 


Chloride    103  ()  mEq/L


 


Carbon Dioxide    22  (21-32)  mEq/L


 


Anion Gap    19.6 H  (5-15)  


 


BUN    12  (7-18)  mg/dL


 


Creatinine    0.6  (0.55-1.02)  mg/dL


 


Est Cr Clr Drug Dosing    80.75  mL/min


 


Estimated GFR (MDRD)    > 60  (>60)  mL/min


 


BUN/Creatinine Ratio    20.0 H  (14-18)  


 


Glucose    87  (70-99)  mg/dL


 


Lactic Acid     (0.4-2.0)  mmol/L


 


Calcium    8.4 L  (8.5-10.1)  mg/dL


 


Magnesium    1.7 L  (1.8-2.4)  mg/dL


 


Total Bilirubin    0.7  (0.2-1.0)  mg/dL


 


GGT    41  (5-55)  U/L


 


AST    21  (15-37)  U/L


 


ALT    12 L  (14-59)  U/L


 


Alkaline Phosphatase    103  ()  U/L


 


Troponin I     (0.00-0.056)  ng/mL


 


C-Reactive Protein    0.6  (<1.0)  mg/dL


 


NT-Pro-B Natriuret Pep     (0-125)  pg/mL


 


Total Protein    5.5 L  (6.4-8.2)  g/dl


 


Albumin    2.3 L  (3.4-5.0)  g/dl


 


Globulin    3.2  gm/dL


 


Albumin/Globulin Ratio    0.7 L  (1-2)  


 


Lipase    79  ()  U/L


 


Urine Color     (Yellow)  


 


Urine Appearance     (Clear)  


 


Urine pH     (5.0-8.0)  


 


Ur Specific Gravity     (1.005-1.030)  


 


Urine Protein     (Negative)  


 


Urine Glucose (UA)     (Negative)  


 


Urine Ketones     (Negative)  


 


Urine Occult Blood     (Negative)  


 


Urine Nitrite     (Negative)  


 


Urine Bilirubin     (Negative)  


 


Urine Urobilinogen     (0.2-1.0)  


 


Ur Leukocyte Esterase     (Negative)  


 


Urine RBC     (0-5)  /hpf


 


Urine WBC     (0-5)  /hpf


 


Ur Squamous Epith Cells     (0-5)  /hpf


 


Urine Bacteria     (FEW)  /hpf


 


Urine Mucus     (FEW)  /hpf


 


Ketones     (0.0-0.3)  mM


 


SARS-CoV-2 RNA (GINNA)     (NEGATIVE)  














  07/20/21 07/20/21 07/20/21 Range/Units





  12:55 12:55 12:55 


 


WBC     (3.98-10.04)  K/mm3


 


RBC     (3.98-5.22)  M/mm3


 


Hgb     (11.2-15.7)  gm/dl


 


Hct     (34.1-44.9)  %


 


MCV     (79.4-94.8)  fl


 


MCH     (25.6-32.2)  pg


 


MCHC     (32.2-35.5)  g/dl


 


RDW Std Deviation     (36.4-46.3)  fL


 


Plt Count     (182-369)  K/mm3


 


MPV     (9.4-12.3)  fl


 


Neut % (Auto)     (34.0-71.1)  %


 


Lymph % (Auto)     (19.3-51.7)  %


 


Mono % (Auto)     (4.7-12.5)  %


 


Eos % (Auto)     (0.7-5.8)  


 


Baso % (Auto)     (0.1-1.2)  %


 


Neut # (Auto)     (1.56-6.13)  K/mm3


 


Lymph # (Auto)     (1.18-3.74)  K/mm3


 


Mono # (Auto)     (0.24-0.36)  K/mm3


 


Eos # (Auto)     (0.04-0.36)  K/mm3


 


Baso # (Auto)     (0.01-0.08)  K/mm3


 


ESR  53 H    (0-20)  mm/hr


 


PT     (9.7-12.0)  SECONDS


 


INR     


 


APTT     (21.7-31.4)  SECONDS


 


Sodium     (136-145)  mEq/L


 


Potassium     (3.5-5.1)  mEq/L


 


Chloride     ()  mEq/L


 


Carbon Dioxide     (21-32)  mEq/L


 


Anion Gap     (5-15)  


 


BUN     (7-18)  mg/dL


 


Creatinine     (0.55-1.02)  mg/dL


 


Est Cr Clr Drug Dosing     mL/min


 


Estimated GFR (MDRD)     (>60)  mL/min


 


BUN/Creatinine Ratio     (14-18)  


 


Glucose     (70-99)  mg/dL


 


Lactic Acid    0.8  (0.4-2.0)  mmol/L


 


Calcium     (8.5-10.1)  mg/dL


 


Magnesium     (1.8-2.4)  mg/dL


 


Total Bilirubin     (0.2-1.0)  mg/dL


 


GGT     (5-55)  U/L


 


AST     (15-37)  U/L


 


ALT     (14-59)  U/L


 


Alkaline Phosphatase     ()  U/L


 


Troponin I     (0.00-0.056)  ng/mL


 


C-Reactive Protein     (<1.0)  mg/dL


 


NT-Pro-B Natriuret Pep   117   (0-125)  pg/mL


 


Total Protein     (6.4-8.2)  g/dl


 


Albumin     (3.4-5.0)  g/dl


 


Globulin     gm/dL


 


Albumin/Globulin Ratio     (1-2)  


 


Lipase     ()  U/L


 


Urine Color     (Yellow)  


 


Urine Appearance     (Clear)  


 


Urine pH     (5.0-8.0)  


 


Ur Specific Gravity     (1.005-1.030)  


 


Urine Protein     (Negative)  


 


Urine Glucose (UA)     (Negative)  


 


Urine Ketones     (Negative)  


 


Urine Occult Blood     (Negative)  


 


Urine Nitrite     (Negative)  


 


Urine Bilirubin     (Negative)  


 


Urine Urobilinogen     (0.2-1.0)  


 


Ur Leukocyte Esterase     (Negative)  


 


Urine RBC     (0-5)  /hpf


 


Urine WBC     (0-5)  /hpf


 


Ur Squamous Epith Cells     (0-5)  /hpf


 


Urine Bacteria     (FEW)  /hpf


 


Urine Mucus     (FEW)  /hpf


 


Ketones     (0.0-0.3)  mM


 


SARS-CoV-2 RNA (GINNA)     (NEGATIVE)  














  07/20/21 07/20/21 07/20/21 Range/Units





  12:55 12:55 13:55 


 


WBC     (3.98-10.04)  K/mm3


 


RBC     (3.98-5.22)  M/mm3


 


Hgb     (11.2-15.7)  gm/dl


 


Hct     (34.1-44.9)  %


 


MCV     (79.4-94.8)  fl


 


MCH     (25.6-32.2)  pg


 


MCHC     (32.2-35.5)  g/dl


 


RDW Std Deviation     (36.4-46.3)  fL


 


Plt Count     (182-369)  K/mm3


 


MPV     (9.4-12.3)  fl


 


Neut % (Auto)     (34.0-71.1)  %


 


Lymph % (Auto)     (19.3-51.7)  %


 


Mono % (Auto)     (4.7-12.5)  %


 


Eos % (Auto)     (0.7-5.8)  


 


Baso % (Auto)     (0.1-1.2)  %


 


Neut # (Auto)     (1.56-6.13)  K/mm3


 


Lymph # (Auto)     (1.18-3.74)  K/mm3


 


Mono # (Auto)     (0.24-0.36)  K/mm3


 


Eos # (Auto)     (0.04-0.36)  K/mm3


 


Baso # (Auto)     (0.01-0.08)  K/mm3


 


ESR     (0-20)  mm/hr


 


PT     (9.7-12.0)  SECONDS


 


INR     


 


APTT     (21.7-31.4)  SECONDS


 


Sodium     (136-145)  mEq/L


 


Potassium     (3.5-5.1)  mEq/L


 


Chloride     ()  mEq/L


 


Carbon Dioxide     (21-32)  mEq/L


 


Anion Gap     (5-15)  


 


BUN     (7-18)  mg/dL


 


Creatinine     (0.55-1.02)  mg/dL


 


Est Cr Clr Drug Dosing     mL/min


 


Estimated GFR (MDRD)     (>60)  mL/min


 


BUN/Creatinine Ratio     (14-18)  


 


Glucose     (70-99)  mg/dL


 


Lactic Acid     (0.4-2.0)  mmol/L


 


Calcium     (8.5-10.1)  mg/dL


 


Magnesium     (1.8-2.4)  mg/dL


 


Total Bilirubin     (0.2-1.0)  mg/dL


 


GGT     (5-55)  U/L


 


AST     (15-37)  U/L


 


ALT     (14-59)  U/L


 


Alkaline Phosphatase     ()  U/L


 


Troponin I   < 0.017   (0.00-0.056)  ng/mL


 


C-Reactive Protein     (<1.0)  mg/dL


 


NT-Pro-B Natriuret Pep     (0-125)  pg/mL


 


Total Protein     (6.4-8.2)  g/dl


 


Albumin     (3.4-5.0)  g/dl


 


Globulin     gm/dL


 


Albumin/Globulin Ratio     (1-2)  


 


Lipase     ()  U/L


 


Urine Color     (Yellow)  


 


Urine Appearance     (Clear)  


 


Urine pH     (5.0-8.0)  


 


Ur Specific Gravity     (1.005-1.030)  


 


Urine Protein     (Negative)  


 


Urine Glucose (UA)     (Negative)  


 


Urine Ketones     (Negative)  


 


Urine Occult Blood     (Negative)  


 


Urine Nitrite     (Negative)  


 


Urine Bilirubin     (Negative)  


 


Urine Urobilinogen     (0.2-1.0)  


 


Ur Leukocyte Esterase     (Negative)  


 


Urine RBC     (0-5)  /hpf


 


Urine WBC     (0-5)  /hpf


 


Ur Squamous Epith Cells     (0-5)  /hpf


 


Urine Bacteria     (FEW)  /hpf


 


Urine Mucus     (FEW)  /hpf


 


Ketones  5.5    (0.0-0.3)  mM


 


SARS-CoV-2 RNA (GINNA)    Negative  (NEGATIVE)  














  07/20/21 Range/Units





  20:45 


 


WBC   (3.98-10.04)  K/mm3


 


RBC   (3.98-5.22)  M/mm3


 


Hgb   (11.2-15.7)  gm/dl


 


Hct   (34.1-44.9)  %


 


MCV   (79.4-94.8)  fl


 


MCH   (25.6-32.2)  pg


 


MCHC   (32.2-35.5)  g/dl


 


RDW Std Deviation   (36.4-46.3)  fL


 


Plt Count   (182-369)  K/mm3


 


MPV   (9.4-12.3)  fl


 


Neut % (Auto)   (34.0-71.1)  %


 


Lymph % (Auto)   (19.3-51.7)  %


 


Mono % (Auto)   (4.7-12.5)  %


 


Eos % (Auto)   (0.7-5.8)  


 


Baso % (Auto)   (0.1-1.2)  %


 


Neut # (Auto)   (1.56-6.13)  K/mm3


 


Lymph # (Auto)   (1.18-3.74)  K/mm3


 


Mono # (Auto)   (0.24-0.36)  K/mm3


 


Eos # (Auto)   (0.04-0.36)  K/mm3


 


Baso # (Auto)   (0.01-0.08)  K/mm3


 


ESR   (0-20)  mm/hr


 


PT   (9.7-12.0)  SECONDS


 


INR   


 


APTT   (21.7-31.4)  SECONDS


 


Sodium   (136-145)  mEq/L


 


Potassium   (3.5-5.1)  mEq/L


 


Chloride   ()  mEq/L


 


Carbon Dioxide   (21-32)  mEq/L


 


Anion Gap   (5-15)  


 


BUN   (7-18)  mg/dL


 


Creatinine   (0.55-1.02)  mg/dL


 


Est Cr Clr Drug Dosing   mL/min


 


Estimated GFR (MDRD)   (>60)  mL/min


 


BUN/Creatinine Ratio   (14-18)  


 


Glucose   (70-99)  mg/dL


 


Lactic Acid   (0.4-2.0)  mmol/L


 


Calcium   (8.5-10.1)  mg/dL


 


Magnesium   (1.8-2.4)  mg/dL


 


Total Bilirubin   (0.2-1.0)  mg/dL


 


GGT   (5-55)  U/L


 


AST   (15-37)  U/L


 


ALT   (14-59)  U/L


 


Alkaline Phosphatase   ()  U/L


 


Troponin I   (0.00-0.056)  ng/mL


 


C-Reactive Protein   (<1.0)  mg/dL


 


NT-Pro-B Natriuret Pep   (0-125)  pg/mL


 


Total Protein   (6.4-8.2)  g/dl


 


Albumin   (3.4-5.0)  g/dl


 


Globulin   gm/dL


 


Albumin/Globulin Ratio   (1-2)  


 


Lipase   ()  U/L


 


Urine Color  Yellow  (Yellow)  


 


Urine Appearance  Slt cloudy H  (Clear)  


 


Urine pH  6.0  (5.0-8.0)  


 


Ur Specific Gravity  1.025  (1.005-1.030)  


 


Urine Protein  Trace H  (Negative)  


 


Urine Glucose (UA)  Negative  (Negative)  


 


Urine Ketones  2+ H  (Negative)  


 


Urine Occult Blood  Negative  (Negative)  


 


Urine Nitrite  Negative  (Negative)  


 


Urine Bilirubin  2+ H  (Negative)  


 


Urine Urobilinogen  2.0 H  (0.2-1.0)  


 


Ur Leukocyte Esterase  Negative  (Negative)  


 


Urine RBC  0-5  (0-5)  /hpf


 


Urine WBC  0-5  (0-5)  /hpf


 


Ur Squamous Epith Cells  10-20 H  (0-5)  /hpf


 


Urine Bacteria  Many H  (FEW)  /hpf


 


Urine Mucus  Moderate H  (FEW)  /hpf


 


Ketones   (0.0-0.3)  mM


 


SARS-CoV-2 RNA (GINNA)   (NEGATIVE)  











Med Orders - Current: 


                               Current Medications





Acetaminophen (Acetaminophen 325 Mg Tab)  650 mg PO Q4H PRN


   PRN Reason: Pain (Mild 1-3)/fever


   Last Admin: 07/20/21 20:35 Dose:  650 mg


   Documented by: 


Albuterol/Ipratropium (Albuterol/Ipratropium 3.0-0.5 Mg/3 Ml Neb Soln)  3 ml NEB

Q4H PRN


   PRN Reason: Shortness Of Breath/wheezing


Docusate Sodium (Docusate Sodium 100 Mg Cap)  100 mg PO BID PRN


   PRN Reason: Constipation


Enoxaparin Sodium (Enoxaparin 40 Mg/0.4 Ml Syringe)  40 mg SUBCUT DAILY [facility]


Lactated Ringer's (Ringers, Lactated)  1,000 mls @ 100 mls/hr IV ASDIRECTED [facility]


   Last Admin: 07/21/21 01:23 Dose:  100 mls/hr


   Documented by: 


Ondansetron HCl (Ondansetron 4 Mg Tab.Dis)  4 mg PO Q4H PRN


   PRN Reason: nausea, able to take PO


Oxycodone HCl (Oxycodone 5 Mg Tab)  5 mg PO Q4H PRN


   PRN Reason: Pain (moderate 4-6)


Temazepam (Temazepam 15 Mg Cap)  15 mg PO BEDTIME PRN


   PRN Reason: Sleep





Discontinued Medications





Dextrose/Sodium Chloride (Dextrose 5%-Normal Saline)  1,000 mls @ 999 mls/hr IV 

ASDIRECTED [facility]


   Last Admin: 07/20/21 13:17 Dose:  999 mls/hr


   Documented by: 


Dextrose/Lactated Ringer's (Dextrose 5%-Lactated Ringers)  1,000 mls @ 500 

mls/hr IV ASDIRECTED [facility]


Ondansetron HCl (Ondansetron 4 Mg/2 Ml Sdv)  4 mg IVPUSH ONETIME ONE


   Stop: 07/20/21 12:51


   Last Admin: 07/20/21 13:17 Dose:  4 mg


   Documented by: 











- Exam


Quality Assessment: Supplemental Oxygen, DVT Prophylaxis


Central Line Total Time: 0Days  15Hours


General: Alert, Oriented, Cooperative, No Acute Distress


HEENT: Pupils Equal, Pupils Reactive, EOMI, Mucous Membr. Moist/Pink


Neck: Supple, Trachea Midline


Lungs: Normal Respiratory Effort, Crackles (Predominantly right side)


Cardiovascular: Regular Rate, Regular Rhythm


GI/Abdominal Exam: Normal Bowel Sounds, Soft, Non-Tender, No Distention


 (Female) Exam: Deferred


Back Exam: Normal Inspection, Full Range of Motion


Extremities: Normal Inspection, Normal Range of Motion, No Pedal Edema


Skin: Warm, Dry, Intact


Neurological: No New Focal Deficit, Normal Gait, Normal Speech


Psy/Mental Status: Alert, Normal Affect, Normal Mood





- Patient Data


Lab Results Last 24 hrs: 


                         Laboratory Results - last 24 hr











  07/20/21 07/20/21 07/20/21 Range/Units





  12:55 12:55 12:55 


 


WBC  11.60 H    (3.98-10.04)  K/mm3


 


RBC  3.73 L    (3.98-5.22)  M/mm3


 


Hgb  11.9    (11.2-15.7)  gm/dl


 


Hct  37.6    (34.1-44.9)  %


 


MCV  100.8 H    (79.4-94.8)  fl


 


MCH  31.9    (25.6-32.2)  pg


 


MCHC  31.6 L    (32.2-35.5)  g/dl


 


RDW Std Deviation  64.2 H    (36.4-46.3)  fL


 


Plt Count  239    (182-369)  K/mm3


 


MPV  10.2    (9.4-12.3)  fl


 


Neut % (Auto)  70.7    (34.0-71.1)  %


 


Lymph % (Auto)  23.8    (19.3-51.7)  %


 


Mono % (Auto)  4.9    (4.7-12.5)  %


 


Eos % (Auto)  0 L    (0.7-5.8)  


 


Baso % (Auto)  0.3    (0.1-1.2)  %


 


Neut # (Auto)  8.20 H    (1.56-6.13)  K/mm3


 


Lymph # (Auto)  2.76    (1.18-3.74)  K/mm3


 


Mono # (Auto)  0.57 H    (0.24-0.36)  K/mm3


 


Eos # (Auto)  0.00 L    (0.04-0.36)  K/mm3


 


Baso # (Auto)  0.04    (0.01-0.08)  K/mm3


 


ESR     (0-20)  mm/hr


 


PT   16.0 H   (9.7-12.0)  SECONDS


 


INR   1.51   


 


APTT   32.0 H   (21.7-31.4)  SECONDS


 


Sodium    141  (136-145)  mEq/L


 


Potassium    3.6  (3.5-5.1)  mEq/L


 


Chloride    103  ()  mEq/L


 


Carbon Dioxide    22  (21-32)  mEq/L


 


Anion Gap    19.6 H  (5-15)  


 


BUN    12  (7-18)  mg/dL


 


Creatinine    0.6  (0.55-1.02)  mg/dL


 


Est Cr Clr Drug Dosing    80.75  mL/min


 


Estimated GFR (MDRD)    > 60  (>60)  mL/min


 


BUN/Creatinine Ratio    20.0 H  (14-18)  


 


Glucose    87  (70-99)  mg/dL


 


Lactic Acid     (0.4-2.0)  mmol/L


 


Calcium    8.4 L  (8.5-10.1)  mg/dL


 


Magnesium    1.7 L  (1.8-2.4)  mg/dL


 


Total Bilirubin    0.7  (0.2-1.0)  mg/dL


 


GGT    41  (5-55)  U/L


 


AST    21  (15-37)  U/L


 


ALT    12 L  (14-59)  U/L


 


Alkaline Phosphatase    103  ()  U/L


 


Troponin I     (0.00-0.056)  ng/mL


 


C-Reactive Protein    0.6  (<1.0)  mg/dL


 


NT-Pro-B Natriuret Pep     (0-125)  pg/mL


 


Total Protein    5.5 L  (6.4-8.2)  g/dl


 


Albumin    2.3 L  (3.4-5.0)  g/dl


 


Globulin    3.2  gm/dL


 


Albumin/Globulin Ratio    0.7 L  (1-2)  


 


Lipase    79  ()  U/L


 


Urine Color     (Yellow)  


 


Urine Appearance     (Clear)  


 


Urine pH     (5.0-8.0)  


 


Ur Specific Gravity     (1.005-1.030)  


 


Urine Protein     (Negative)  


 


Urine Glucose (UA)     (Negative)  


 


Urine Ketones     (Negative)  


 


Urine Occult Blood     (Negative)  


 


Urine Nitrite     (Negative)  


 


Urine Bilirubin     (Negative)  


 


Urine Urobilinogen     (0.2-1.0)  


 


Ur Leukocyte Esterase     (Negative)  


 


Urine RBC     (0-5)  /hpf


 


Urine WBC     (0-5)  /hpf


 


Ur Squamous Epith Cells     (0-5)  /hpf


 


Urine Bacteria     (FEW)  /hpf


 


Urine Mucus     (FEW)  /hpf


 


Ketones     (0.0-0.3)  mM


 


SARS-CoV-2 RNA (GINNA)     (NEGATIVE)  














  07/20/21 07/20/21 07/20/21 Range/Units





  12:55 12:55 12:55 


 


WBC     (3.98-10.04)  K/mm3


 


RBC     (3.98-5.22)  M/mm3


 


Hgb     (11.2-15.7)  gm/dl


 


Hct     (34.1-44.9)  %


 


MCV     (79.4-94.8)  fl


 


MCH     (25.6-32.2)  pg


 


MCHC     (32.2-35.5)  g/dl


 


RDW Std Deviation     (36.4-46.3)  fL


 


Plt Count     (182-369)  K/mm3


 


MPV     (9.4-12.3)  fl


 


Neut % (Auto)     (34.0-71.1)  %


 


Lymph % (Auto)     (19.3-51.7)  %


 


Mono % (Auto)     (4.7-12.5)  %


 


Eos % (Auto)     (0.7-5.8)  


 


Baso % (Auto)     (0.1-1.2)  %


 


Neut # (Auto)     (1.56-6.13)  K/mm3


 


Lymph # (Auto)     (1.18-3.74)  K/mm3


 


Mono # (Auto)     (0.24-0.36)  K/mm3


 


Eos # (Auto)     (0.04-0.36)  K/mm3


 


Baso # (Auto)     (0.01-0.08)  K/mm3


 


ESR  53 H    (0-20)  mm/hr


 


PT     (9.7-12.0)  SECONDS


 


INR     


 


APTT     (21.7-31.4)  SECONDS


 


Sodium     (136-145)  mEq/L


 


Potassium     (3.5-5.1)  mEq/L


 


Chloride     ()  mEq/L


 


Carbon Dioxide     (21-32)  mEq/L


 


Anion Gap     (5-15)  


 


BUN     (7-18)  mg/dL


 


Creatinine     (0.55-1.02)  mg/dL


 


Est Cr Clr Drug Dosing     mL/min


 


Estimated GFR (MDRD)     (>60)  mL/min


 


BUN/Creatinine Ratio     (14-18)  


 


Glucose     (70-99)  mg/dL


 


Lactic Acid    0.8  (0.4-2.0)  mmol/L


 


Calcium     (8.5-10.1)  mg/dL


 


Magnesium     (1.8-2.4)  mg/dL


 


Total Bilirubin     (0.2-1.0)  mg/dL


 


GGT     (5-55)  U/L


 


AST     (15-37)  U/L


 


ALT     (14-59)  U/L


 


Alkaline Phosphatase     ()  U/L


 


Troponin I     (0.00-0.056)  ng/mL


 


C-Reactive Protein     (<1.0)  mg/dL


 


NT-Pro-B Natriuret Pep   117   (0-125)  pg/mL


 


Total Protein     (6.4-8.2)  g/dl


 


Albumin     (3.4-5.0)  g/dl


 


Globulin     gm/dL


 


Albumin/Globulin Ratio     (1-2)  


 


Lipase     ()  U/L


 


Urine Color     (Yellow)  


 


Urine Appearance     (Clear)  


 


Urine pH     (5.0-8.0)  


 


Ur Specific Gravity     (1.005-1.030)  


 


Urine Protein     (Negative)  


 


Urine Glucose (UA)     (Negative)  


 


Urine Ketones     (Negative)  


 


Urine Occult Blood     (Negative)  


 


Urine Nitrite     (Negative)  


 


Urine Bilirubin     (Negative)  


 


Urine Urobilinogen     (0.2-1.0)  


 


Ur Leukocyte Esterase     (Negative)  


 


Urine RBC     (0-5)  /hpf


 


Urine WBC     (0-5)  /hpf


 


Ur Squamous Epith Cells     (0-5)  /hpf


 


Urine Bacteria     (FEW)  /hpf


 


Urine Mucus     (FEW)  /hpf


 


Ketones     (0.0-0.3)  mM


 


SARS-CoV-2 RNA ([facility])     (NEGATIVE)  














  07/20/21 07/20/21 07/20/21 Range/Units





  12:55 12:55 13:55 


 


WBC     (3.98-10.04)  K/mm3


 


RBC     (3.98-5.22)  M/mm3


 


Hgb     (11.2-15.7)  gm/dl


 


Hct     (34.1-44.9)  %


 


MCV     (79.4-94.8)  fl


 


MCH     (25.6-32.2)  pg


 


MCHC     (32.2-35.5)  g/dl


 


RDW Std Deviation     (36.4-46.3)  fL


 


Plt Count     (182-369)  K/mm3


 


MPV     (9.4-12.3)  fl


 


Neut % (Auto)     (34.0-71.1)  %


 


Lymph % (Auto)     (19.3-51.7)  %


 


Mono % (Auto)     (4.7-12.5)  %


 


Eos % (Auto)     (0.7-5.8)  


 


Baso % (Auto)     (0.1-1.2)  %


 


Neut # (Auto)     (1.56-6.13)  K/mm3


 


Lymph # (Auto)     (1.18-3.74)  K/mm3


 


Mono # (Auto)     (0.24-0.36)  K/mm3


 


Eos # (Auto)     (0.04-0.36)  K/mm3


 


Baso # (Auto)     (0.01-0.08)  K/mm3


 


ESR     (0-20)  mm/hr


 


PT     (9.7-12.0)  SECONDS


 


INR     


 


APTT     (21.7-31.4)  SECONDS


 


Sodium     (136-145)  mEq/L


 


Potassium     (3.5-5.1)  mEq/L


 


Chloride     ()  mEq/L


 


Carbon Dioxide     (21-32)  mEq/L


 


Anion Gap     (5-15)  


 


BUN     (7-18)  mg/dL


 


Creatinine     (0.55-1.02)  mg/dL


 


Est Cr Clr Drug Dosing     mL/min


 


Estimated GFR (MDRD)     (>60)  mL/min


 


BUN/Creatinine Ratio     (14-18)  


 


Glucose     (70-99)  mg/dL


 


Lactic Acid     (0.4-2.0)  mmol/L


 


Calcium     (8.5-10.1)  mg/dL


 


Magnesium     (1.8-2.4)  mg/dL


 


Total Bilirubin     (0.2-1.0)  mg/dL


 


GGT     (5-55)  U/L


 


AST     (15-37)  U/L


 


ALT     (14-59)  U/L


 


Alkaline Phosphatase     ()  U/L


 


Troponin I   < 0.017   (0.00-0.056)  ng/mL


 


C-Reactive Protein     (<1.0)  mg/dL


 


NT-Pro-B Natriuret Pep     (0-125)  pg/mL


 


Total Protein     (6.4-8.2)  g/dl


 


Albumin     (3.4-5.0)  g/dl


 


Globulin     gm/dL


 


Albumin/Globulin Ratio     (1-2)  


 


Lipase     ()  U/L


 


Urine Color     (Yellow)  


 


Urine Appearance     (Clear)  


 


Urine pH     (5.0-8.0)  


 


Ur Specific Gravity     (1.005-1.030)  


 


Urine Protein     (Negative)  


 


Urine Glucose (UA)     (Negative)  


 


Urine Ketones     (Negative)  


 


Urine Occult Blood     (Negative)  


 


Urine Nitrite     (Negative)  


 


Urine Bilirubin     (Negative)  


 


Urine Urobilinogen     (0.2-1.0)  


 


Ur Leukocyte Esterase     (Negative)  


 


Urine RBC     (0-5)  /hpf


 


Urine WBC     (0-5)  /hpf


 


Ur Squamous Epith Cells     (0-5)  /hpf


 


Urine Bacteria     (FEW)  /hpf


 


Urine Mucus     (FEW)  /hpf


 


Ketones  5.5    (0.0-0.3)  mM


 


SARS-CoV-2 RNA ([facility])    Negative  (NEGATIVE)  














  07/20/21 Range/Units





  20:45 


 


WBC   (3.98-10.04)  K/mm3


 


RBC   (3.98-5.22)  M/mm3


 


Hgb   (11.2-15.7)  gm/dl


 


Hct   (34.1-44.9)  %


 


MCV   (79.4-94.8)  fl


 


MCH   (25.6-32.2)  pg


 


MCHC   (32.2-35.5)  g/dl


 


RDW Std Deviation   (36.4-46.3)  fL


 


Plt Count   (182-369)  K/mm3


 


MPV   (9.4-12.3)  fl


 


Neut % (Auto)   (34.0-71.1)  %


 


Lymph % (Auto)   (19.3-51.7)  %


 


Mono % (Auto)   (4.7-12.5)  %


 


Eos % (Auto)   (0.7-5.8)  


 


Baso % (Auto)   (0.1-1.2)  %


 


Neut # (Auto)   (1.56-6.13)  K/mm3


 


Lymph # (Auto)   (1.18-3.74)  K/mm3


 


Mono # (Auto)   (0.24-0.36)  K/mm3


 


Eos # (Auto)   (0.04-0.36)  K/mm3


 


Baso # (Auto)   (0.01-0.08)  K/mm3


 


ESR   (0-20)  mm/hr


 


PT   (9.7-12.0)  SECONDS


 


INR   


 


APTT   (21.7-31.4)  SECONDS


 


Sodium   (136-145)  mEq/L


 


Potassium   (3.5-5.1)  mEq/L


 


Chloride   ()  mEq/L


 


Carbon Dioxide   (21-32)  mEq/L


 


Anion Gap   (5-15)  


 


BUN   (7-18)  mg/dL


 


Creatinine   (0.55-1.02)  mg/dL


 


Est Cr Clr Drug Dosing   mL/min


 


Estimated GFR (MDRD)   (>60)  mL/min


 


BUN/Creatinine Ratio   (14-18)  


 


Glucose   (70-99)  mg/dL


 


Lactic Acid   (0.4-2.0)  mmol/L


 


Calcium   (8.5-10.1)  mg/dL


 


Magnesium   (1.8-2.4)  mg/dL


 


Total Bilirubin   (0.2-1.0)  mg/dL


 


GGT   (5-55)  U/L


 


AST   (15-37)  U/L


 


ALT   (14-59)  U/L


 


Alkaline Phosphatase   ()  U/L


 


Troponin I   (0.00-0.056)  ng/mL


 


C-Reactive Protein   (<1.0)  mg/dL


 


NT-Pro-B Natriuret Pep   (0-125)  pg/mL


 


Total Protein   (6.4-8.2)  g/dl


 


Albumin   (3.4-5.0)  g/dl


 


Globulin   gm/dL


 


Albumin/Globulin Ratio   (1-2)  


 


Lipase   ()  U/L


 


Urine Color  Yellow  (Yellow)  


 


Urine Appearance  Slt cloudy H  (Clear)  


 


Urine pH  6.0  (5.0-8.0)  


 


Ur Specific Gravity  1.025  (1.005-1.030)  


 


Urine Protein  Trace H  (Negative)  


 


Urine Glucose (UA)  Negative  (Negative)  


 


Urine Ketones  2+ H  (Negative)  


 


Urine Occult Blood  Negative  (Negative)  


 


Urine Nitrite  Negative  (Negative)  


 


Urine Bilirubin  2+ H  (Negative)  


 


Urine Urobilinogen  2.0 H  (0.2-1.0)  


 


Ur Leukocyte Esterase  Negative  (Negative)  


 


Urine RBC  0-5  (0-5)  /hpf


 


Urine WBC  0-5  (0-5)  /hpf


 


Ur Squamous Epith Cells  10-20 H  (0-5)  /hpf


 


Urine Bacteria  Many H  (FEW)  /hpf


 


Urine Mucus  Moderate H  (FEW)  /hpf


 


Ketones   (0.0-0.3)  mM


 


SARS-CoV-2 RNA (GINNA)   (NEGATIVE)  











Result Diagrams: 


                                 07/20/21 12:55





                                 07/20/21 12:55





Sepsis Event Note





- Evaluation


Sepsis Screening Result: No Definite Risk





- Focused Exam


Vital Signs: 


                                   Vital Signs











  Temp Pulse Resp BP Pulse Ox


 


 07/21/21 06:04  36.4 C  92  16  109/69  94 L


 


 07/20/21 20:21  36.6 C  92  20  110/65  93 L














- Problem List & Annotations


(1) Adult failure to thrive syndrome


SNOMED Code(s): 973837553


   Code(s): R62.7 - ADULT FAILURE TO THRIVE   Status: Acute   Priority: High   

Current Visit: Yes   





(2) Metastatic cancer to bone


Status: Chronic   Priority: High   Current Visit: Yes   





(3) Metastatic cancer to brain


Status: Chronic   Priority: High   Current Visit: Yes   





(4) Metastatic cancer to liver


Status: Chronic   Priority: High   Current Visit: Yes   





(5) Chronic pain due to malignant neoplastic disease


SNOMED Code(s): 389471254


   Code(s): G89.3 - NEOPLASM RELATED PAIN (ACUTE) (CHRONIC)   Status: Acute   

Priority: High   Current Visit: Yes   





(6) Dehydration


SNOMED Code(s): 57679422


   Code(s): E86.0 - DEHYDRATION   Status: Resolved   Priority: High   Current 

Visit: Yes   





(7) Anorexia


SNOMED Code(s): 00601464


   Code(s): R63.0 - ANOREXIA   Status: Chronic   Priority: High   Current Visit:

Yes   





- Problem List Review


Problem List Initiated/Reviewed/Updated: Yes





- My Orders


Last 24 Hours: 


My Active Orders





07/20/21 Lunch


Heart Healthy Diet [DIET] 





07/20/21 13:58


Patient Status [ADT] Routine 


Oxygen Therapy [RC] PRN 


RT Aerosol Therapy [RC] ASDIRECTED 


Up With Assistance [RC] ASDIRECTED 


Vital Signs [RC] Q6H 


Consult to Hospice [CONS] Routine 


Acetaminophen [TylenoL]   650 mg PO Q4H PRN 


Albuterol/Ipratropium [DuoNeb 3.0-0.5 MG/3 ML]   3 ml NEB Q4H PRN 


Docusate Sodium [Colace]   100 mg PO BID PRN 


Ondansetron [Zofran ODT]   4 mg PO Q4H PRN 


oxyCODONE   5 mg PO Q4H PRN 


Resuscitation Status Routine 





07/20/21 14:00


Lactated Ringers [Ringers, Lactated] 1,000 ml IV ASDIRECTED 





07/20/21 21:00


Temazepam [Restoril]   15 mg PO BEDTIME PRN 





07/21/21 09:00


Enoxaparin [Lovenox]   40 mg SUBCUT DAILY 














- Assessment


Assessment:: 





The patient is a chronically ill 70-year-old lady who was admitted primarily out

 of concern for severe dehydration.  She has been fluid resuscitated and her IV 

fluids will be continued.  She is also been recommended to continue with her 

diet as tolerated.  The patient and the patient's  are also considering 

going home and not necessarily on hospice.  Physical therapy to evaluate.  

Possible home tomorrow with home health.





- Plan


Plan:: 





The patient is a 70-year-old lady who has been admitted to inpatient under DO 

NOT INTUBATE/DO NOT RESUSCITATE comfort measures only category. She has since 

really has end-stage cancer. The patient will be rehydrated as she is 

hypovolemic. The patient will be fluid resuscitated with the use of Ringer's 

lactate at 100 mL/h. The patient's pain will be controlled with the use of 

narcotic pain medications as necessary. I have also ordered Ativan to help with 

anxiety. No further testing will be ordered. Hospice has been consulted. I also 

had a long discussion with the patient and the patient's  regarding 

hospice and palliative care. Overall the patient's prognosis is poor.

## 2021-07-22 NOTE — [id number]
**Discharge Summary





- Discharge Data


Discharge Date: 07/22/21


Discharge Disposition: Home, W Home Health Agency [facility]


Condition: Fair





- Referral to Home Health


Date of Face to Face Encounter: 07/22/21


Reason for Homebound Status: A face-to-face meeting was conducted with the 

patient and family.  The patient has the following diagnoses; weakness, 

anorexia, adult failure to thrive, metastatic cancer to bones/brain/liver, 

chronic pain due to malignant neoplastic disease, dehydration and see discharge 

summary for additional diagnoses.  The patient needs home health care nursing 

services for; skilled assessment, vital signs, disease education and management,

disease progression education, pain management and medication education.  

Physical therapy for gait training, transfer training, safety education, n

euromuscular reeducation, therapeutic exercise, caregiver training, balance 

training, equipment and recommendations.  Occupational Therapy for activities of

daily living, balance training, functional mobility training, safety education, 

therapeutic activities, therapeutic exercise.  CNA assistance for the patient 

with activities of daily living.  Patient is currently homebound related to 

decreased activity tolerance, decreased level of endurance and need an assist 

for a walker.  The patient will be followed by her primary provider Dr. Kaur.


Primary Care Physician: 


Fritz Kaur MD





Skilled Need: Assistance with ADL's





- Discharge Diagnosis/Problem(s)


(1) Adult failure to thrive syndrome


SNOMED Code(s): 157829924


   ICD Code: R62.7 - ADULT FAILURE TO THRIVE   Status: Chronic   Priority: High 

 





(2) Metastatic cancer to bone


Status: Chronic   Priority: High   





(3) Metastatic cancer to brain


Status: Chronic   Priority: High   





(4) Metastatic cancer to liver


Status: Chronic   Priority: High   





(5) Chronic pain due to malignant neoplastic disease


SNOMED Code(s): 104418821


   ICD Code: G89.3 - NEOPLASM RELATED PAIN (ACUTE) (CHRONIC)   Status: Acute   

Priority: High   





(6) Dehydration


SNOMED Code(s): 77778054


   ICD Code: E86.0 - DEHYDRATION   Status: Resolved   Priority: High   





(7) Anorexia


SNOMED Code(s): 08693591


   ICD Code: R63.0 - ANOREXIA   Status: Chronic   Priority: High   





- Patient Summary/Data


Consults: 


                                  Consultations





07/20/21 13:58


Consult to Hospice [CONS] Routine 





07/21/21 11:29


PT Evaluation and Treatment [CONS] Routine 





07/21/21 11:30


OT Evaluation and Treatment [CONS] Routine 











Hospital Course: 





The patient is a 70-year-old lady who had presented to the emergency department 

essentially unable to care for herself.  She has not eaten or drank anything in 

3 days.  She does have an underlying history of lung cancer that has 

metastasized to her brain, spine and liver.  The patient has reported that 

nothing tastes good for her.  The patient has a chemotherapy appointment 

tomorrow that she canceled as she does not want any further chemotherapy or 

radiation treatment.  The patient also says that she has been too weak to use 

her walker at home.  Reportedly the patient's brain tumors have grown.  Because 

of the patient's dehydration she was aggressively resuscitated with the use of 

lactated Ringer's.  The patient also had been afforded a diet as tolerated 

however she said that she had not been eating very well because everything 

tastes metallic.  The discussion was held with the patient and the patient's 

, Dalton, and the idea of comfort measures and hospice care was provided.

  The patient initially did not want hospice but she had discontinued her 

chemotherapy.  The patient's fluid volume status had improved after her short 

course of hospitalization.  I am concerned that she may end up in the same 

situation if she does not maintain adequate fluid intake.  The patient had 

improved to the point that she felt like she could be discharged to see the rest

 of her family.  The patient also reported that she had "things" to do and 

wanted to go home for this.  The patient has been recommended continue with her 

diet as tolerated.  Home health with PT OT has also been ordered.  Hospice 

consult has been completed as the patient and the patient's  have 

inquired about hospice.  The patient is also to have activity as tolerated.  She

 has been discharged from acute hospitalization with the recommendations listed 

above.





At this point and the patient's chronic illness that she is a very high risk for

 readmission.  Overall, the patient's prognosis is poor.





- Patient Instructions


Diet: Usual Diet as Tolerated


Activity: As Tolerated





- Discharge Plan


*PRESCRIPTION DRUG MONITORING PROGRAM REVIEWED*: Not Applicable


*COPY OF PRESCRIPTION DRUG MONITORING REPORT IN PATIENT ELOINA: Not Applicable


Home Medications: 


                                    Home Meds





Non-Formulary Medication [NF Drug] 0 each .XX ASDIRECTED 07/20/21 [History]








Other Amb Orders: 


OT Evaluation and Treatment [CONS] Location: None Selected


PT Evaluation and Treatment [CONS] Location: None Selected


Patient Handouts:  Failure to Thrive, Adult, Easy-to-Read


Forms:  ED Department Discharge


Referrals: 


Fritz Kuar MD [Primary Care Provider] - 07/30/21 8:10 am (this is your 

arrival time.)





- Discharge Summary/Plan Comment


DC Time >30 min.: Yes





- General Info


Date of Service: 07/22/21


Admission Dx/Problem (Free Text: 


                           Admission Diagnosis/Problem





Admission Diagnosis/Problem      physically weak, unable to ambulate, lung 

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  The patient is not continuing 

chemotherapy








Subjective Update: 





The patient says that she is feeling better.  She is concerned about 

accomplishing things at home.  The patient and the patient's  thinks she 

can go home.


Functional Status: Reports: Pain Controlled, Tolerating Diet





- Review of Systems


General: Reports: Weakness, Fatigue


HEENT: Reports: No Symptoms


Pulmonary: Reports: No Symptoms


Cardiovascular: Reports: No Symptoms


Gastrointestinal: Reports: No Symptoms


Genitourinary: Reports: No Symptoms


Musculoskeletal: Reports: No Symptoms


Skin: Reports: No Symptoms


Neurological: Reports: No Symptoms


Psychiatric: Reports: No Symptoms





- Patient Data


Vitals - Most Recent: 


                                Last Vital Signs











Temp  36.7 C   07/21/21 20:30


 


Pulse  94   07/21/21 20:30


 


Resp  18   07/21/21 20:30


 


BP  102/56 L  07/21/21 20:30


 


Pulse Ox  92 L  07/21/21 20:30











Weight - Most Recent: 69.4 kg


I&O - Last 24 hours: 


                                 Intake & Output











 07/21/21 07/22/21 07/22/21





 22:59 06:59 14:59


 


Intake Total 2733 1395 


 


Output Total 400 450 


 


Balance 2333 945 











Med Orders - Current: 


                               Current Medications





Acetaminophen (Acetaminophen 325 Mg Tab)  650 mg PO Q4H PRN


   PRN Reason: Pain (Mild 1-3)/fever


   Last Admin: 07/20/21 20:35 Dose:  650 mg


   Documented by: 


Albuterol/Ipratropium (Albuterol/Ipratropium 3.0-0.5 Mg/3 Ml Neb Soln)  3 ml NEB

Q4H PRN


   PRN Reason: Shortness Of Breath/wheezing


Docusate Sodium (Docusate Sodium 100 Mg Cap)  100 mg PO BID PRN


   PRN Reason: Constipation


Enoxaparin Sodium (Enoxaparin 40 Mg/0.4 Ml Syringe)  40 mg SUBCUT DAILY [facility]


   Last Admin: 07/21/21 08:15 Dose:  40 mg


   Documented by: 


Lactated Ringer's (Ringers, Lactated)  1,000 mls @ 100 mls/hr IV ASDIRECTED [facility]


   Last Admin: 07/21/21 20:30 Dose:  100 mls/hr


   Documented by: 


Lorazepam (Lorazepam 2 Mg/Ml Sdv)  1 mg IVPUSH Q6HR PRN


   PRN Reason: anxiety/agitation


Ondansetron HCl (Ondansetron 4 Mg Tab.Dis)  4 mg PO Q4H PRN


   PRN Reason: nausea, able to take PO


Oxycodone HCl (Oxycodone 5 Mg Tab)  5 mg PO Q4H PRN


   PRN Reason: Pain (moderate 4-6)


   Last Admin: 07/21/21 16:17 Dose:  5 mg


   Documented by: 


Temazepam (Temazepam 15 Mg Cap)  15 mg PO BEDTIME PRN


   PRN Reason: Sleep





Discontinued Medications





Dextrose/Sodium Chloride (Dextrose 5%-Normal Saline)  1,000 mls @ 999 mls/hr IV 

ASDIRECTED [facility]


   Last Admin: 07/20/21 13:17 Dose:  999 mls/hr


   Documented by: 


Dextrose/Lactated Ringer's (Dextrose 5%-Lactated Ringers)  1,000 mls @ 500 

[facility]


Ondansetron HCl (Ondansetron 4 Mg/2 Ml Sdv)  4 mg IVPUSH ONETIME ONE


   Stop: 07/20/21 12:51


   Last Admin: 07/20/21 13:17 Dose:  4 mg


   Documented by: 











- Exam


Quality Assessment: Denies: Supplemental Oxygen, DVT Prophylaxis


General: Reports: Alert, Oriented, Cooperative


HEENT: Reports: Pupils Equal, Pupils Reactive, EOMI, Mucous Membr. Moist/Pink


Neck: Reports: Supple, Trachea Midline


Lungs: Reports: Decreased Breath Sounds, Crackles


Cardiovascular: Reports: Regular Rate, Regular Rhythm


GI/Abdominal Exam: Normal Bowel Sounds, Soft, No Distention


 (Female) Exam: Deferred


Rectal (Female) Exam: Deferred


Back Exam: Reports: Normal Inspection, Full Range of Motion


Extremities: Normal Inspection, No Pedal Edema


Skin: Reports: Warm, Dry, Intact


Neurological: Reports: No New Focal Deficit


Psy/Mental Status: Reports: Alert, Normal Affect, Depressed

## 2021-11-14 ENCOUNTER — HOSPITAL ENCOUNTER (EMERGENCY)
Dept: HOSPITAL 41 - JD.ED | Age: 71
Discharge: NURSING, REHAB OR LONG-TERM CARE | End: 2021-11-14
Payer: COMMERCIAL

## 2021-11-14 DIAGNOSIS — Z87.891: ICD-10-CM

## 2021-11-14 DIAGNOSIS — R09.02: ICD-10-CM

## 2021-11-14 DIAGNOSIS — Z20.822: ICD-10-CM

## 2021-11-14 DIAGNOSIS — I26.99: Primary | ICD-10-CM

## 2021-11-14 PROCEDURE — 96365 THER/PROPH/DIAG IV INF INIT: CPT

## 2021-11-14 PROCEDURE — 83880 ASSAY OF NATRIURETIC PEPTIDE: CPT

## 2021-11-14 PROCEDURE — 87804 INFLUENZA ASSAY W/OPTIC: CPT

## 2021-11-14 PROCEDURE — 93005 ELECTROCARDIOGRAM TRACING: CPT

## 2021-11-14 PROCEDURE — 36415 COLL VENOUS BLD VENIPUNCTURE: CPT

## 2021-11-14 PROCEDURE — 80053 COMPREHEN METABOLIC PANEL: CPT

## 2021-11-14 PROCEDURE — 87635 SARS-COV-2 COVID-19 AMP PRB: CPT

## 2021-11-14 PROCEDURE — 71045 X-RAY EXAM CHEST 1 VIEW: CPT

## 2021-11-14 PROCEDURE — 99285 EMERGENCY DEPT VISIT HI MDM: CPT

## 2021-11-14 PROCEDURE — 85379 FIBRIN DEGRADATION QUANT: CPT

## 2021-11-14 PROCEDURE — 85730 THROMBOPLASTIN TIME PARTIAL: CPT

## 2021-11-14 PROCEDURE — 96368 THER/DIAG CONCURRENT INF: CPT

## 2021-11-14 PROCEDURE — 85025 COMPLETE CBC W/AUTO DIFF WBC: CPT

## 2021-11-14 PROCEDURE — 84484 ASSAY OF TROPONIN QUANT: CPT

## 2021-11-14 PROCEDURE — 96366 THER/PROPH/DIAG IV INF ADDON: CPT

## 2021-11-14 PROCEDURE — U0002 COVID-19 LAB TEST NON-CDC: HCPCS

## 2021-11-14 PROCEDURE — 85610 PROTHROMBIN TIME: CPT

## 2021-11-14 PROCEDURE — 86140 C-REACTIVE PROTEIN: CPT

## 2021-11-14 PROCEDURE — 71275 CT ANGIOGRAPHY CHEST: CPT

## 2021-11-14 NOTE — EDM.PDOC
ED HPI GENERAL MEDICAL PROBLEM





- General


Chief Complaint: Respiratory Problem


Stated Complaint: [facility]


Time Seen by Provider: 11/14/21 12:40


Source of Information: Reports: Patient, RN Notes Reviewed


History Limitations: Reports: No Limitations





- History of Present Illness


INITIAL COMMENTS - FREE TEXT/NARRATIVE: 





Patient is a 71-year-old female who presents to the ER by [facility] ambulance 

service for her shortness of breath.  States she has been ill for about 1 week 

now.  States that she has been around her grandchildren who she was made aware 

that had a cough or cold.  Been having maybe some hot and cold feelings but no 

discernible fevers, a dry nonproductive cough, and just generalized lethargy.  

Patient states he has a history of lung cancer with mets to the back and brain. 

She is not currently on any sort of chemo or radiation.  States that she had 1 

dose of a Covid vaccine but did not receive the second.  Primary care providers 

Dr. Kaur.  Patient was placed on oxygen via ambulance service, was on 10 L 

by a simple mask.  I have titrated her down to 8 L by simple mask in the ER, O2 

sats are about 95% at that time.  Patient appears to be in no visible 

respiratory distress.


  ** Upper Back


Pain Score (Numeric/FACES): 8





- Related Data


Home Meds: 


                                    Home Meds





dexAMETHasone [Dexamethasone] 8 mg PO DAILY 11/14/21 [History]











Past Medical History


Oncologic (Cancer) History: Reports: Brain, Lung (primary lung w/ met to brain 

and back), Metastatic





Social & Family History





- Tobacco Use


Tobacco Use Status *Q: Former Tobacco User


Used Tobacco, but Quit: Yes


Month/Year Tobacco Last Used: 30 years ago





ED ROS GENERAL





- Review of Systems


Review Of Systems: Comprehensive ROS is negative, except as noted in HPI.





ED EXAM, GENERAL





- Physical Exam


Exam: See Below


Exam Limited By: No Limitations


General Appearance: Alert, WD/WN, No Apparent Distress


Respiratory/Chest: No Respiratory Distress, Lungs Clear, Normal Breath Sounds, 

No Accessory Muscle Use, Chest Non-Tender


Cardiovascular: Normal Peripheral Pulses, Regular Rate, Rhythm, No Edema


GI/Abdominal: Normal Bowel Sounds, Soft, Non-Tender, No Distention, No Mass


Extremities: Normal Inspection, Normal Capillary Refill


Neurological: Alert, Oriented, Normal Cognition, No Motor/Sensory Deficits


Psychiatric: Normal Affect, Normal Mood


Skin Exam: Warm, Dry, Intact, Normal Color, No Rash


  ** #1 Interpretation


EKG Date: 11/14/21


Time: 01:03


Rhythm: NSR (sinus tach)


Rate (Beats/Min): 103


Axis: Normal


P-Wave: Present


QRS: Normal


ST-T: Normal


QT: Normal


Comparison: NA - No Prior EKG


EKG Interpretation Comments: 





No obvious ischemia or acute ST changes noted, reviewed by myself and Dr. Martin.





Course





- Vital Signs


Last Recorded V/S: 


                                Last Vital Signs











Temp  97.9 F   11/14/21 12:37


 


Pulse  112 H  11/14/21 12:37


 


Resp  20   11/14/21 12:37


 


BP  90/70   11/14/21 12:37


 


Pulse Ox  79 L  11/14/21 12:37














- Orders/Labs/Meds


Orders: 


                               Active Orders 24 hr











 Category Date Time Status


 


 Oxygen Therapy, ED [RC] ASDIRECTED Care  11/14/21 12:52 Active


 


 Peripheral IV Care [RC] .AS DIRECTED Care  11/14/21 12:52 Active


 


 Ang Chest [CT] Stat Exams  11/14/21 13:49 Ordered


 


 Chest 1V Frontal [CR] Stat Exams  11/14/21 12:51 Ordered


 


 INR,PT,PROTHROMBIN TIME [COAG] Stat Lab  11/14/21 16:03 Ordered


 


 PRO B-TYPE NATRIUR PEPT,BNPPRO [CHEM] Stat Lab  11/14/21 16:00 Ordered


 


 PTT,PARTIAL THROMBOPLSTIN TIME [COAG] Stat Lab  11/14/21 16:03 Ordered


 


 aPTT [PTT,PARTIAL THROMBOPLSTIN TIME] [COAG] Stat Lab  11/14/21 22:45 Ordered


 


 Heparin Sodium/D5W [Heparin 25,000 Units in D5W 500 ML] Med  11/14/21 16:15 

Ordered





 25,000 units in 500 ml IV TITRATE   


 


 Sodium Chloride 0.9% [Saline Flush] Med  11/14/21 12:52 Active





 10 ml FLUSH ASDIRECTED PRN   


 


 Peripheral IV Insertion Adult [OM.PC] Routine Oth  11/14/21 12:52 Ordered








                                Medication Orders





Heparin Sodium/Dextrose (Heparin 25,000 Units In D5w 500 Ml)  25,000 units in 

500 mls @ 21.881 mls/hr IV TITRATE SCARLETT; Protocol


   Last Admin: 11/14/21 16:34  Dose: 18 units/kg/hr, 21.881 mls/hr


   Documented by: LUBNA   Cosigned by: JULIANN


Sodium Chloride (Sodium Chloride 0.9% 10 Ml Syringe)  10 ml FLUSH ASDIRECTED PRN


   PRN Reason: Keep Vein Open


   Last Admin: 11/14/21 13:23  Dose: 10 ml


   Documented by: LUBNA








Labs: 


                                Laboratory Tests











  11/14/21 11/14/21 11/14/21 Range/Units





  12:30 13:15 13:15 


 


WBC   8.30   (3.98-10.04)  K/mm3


 


RBC   3.38 L   (3.98-5.22)  M/mm3


 


Hgb   11.7   (11.2-15.7)  gm/dl


 


Hct   36.6   (34.1-44.9)  %


 


MCV   108.3 H   (79.4-94.8)  fl


 


MCH   34.6 H   (25.6-32.2)  pg


 


MCHC   32.0 L   (32.2-35.5)  g/dl


 


RDW Std Deviation   66.6 H   (36.4-46.3)  fL


 


Plt Count   142 L   (182-369)  K/mm3


 


MPV   9.1 L   (9.4-12.3)  fl


 


Neut % (Auto)   78.5 H   (34.0-71.1)  %


 


Lymph % (Auto)   16.6 L   (19.3-51.7)  %


 


Mono % (Auto)   4.0 L   (4.7-12.5)  %


 


Eos % (Auto)   0.1 L   (0.7-5.8)  


 


Baso % (Auto)   0.2   (0.1-1.2)  %


 


Neut # (Auto)   6.51 H   (1.56-6.13)  K/mm3


 


Lymph # (Auto)   1.38   (1.18-3.74)  K/mm3


 


Mono # (Auto)   0.33   (0.24-0.36)  K/mm3


 


Eos # (Auto)   0.01 L   (0.04-0.36)  K/mm3


 


Baso # (Auto)   0.02   (0.01-0.08)  K/mm3


 


D-Dimer, Quantitative    3.05 H  (0.19-0.50)  mg/L


 


Sodium     (136-145)  mEq/L


 


Potassium     (3.5-5.1)  mEq/L


 


Chloride     ()  mEq/L


 


Carbon Dioxide     (21-32)  mEq/L


 


Anion Gap     (5-15)  


 


BUN     (7-18)  mg/dL


 


Creatinine     (0.55-1.02)  mg/dL


 


Est Cr Clr Drug Dosing     mL/min


 


Estimated GFR (MDRD)     (>60)  mL/min


 


BUN/Creatinine Ratio     (14-18)  


 


Glucose     (70-99)  mg/dL


 


Calcium     (8.5-10.1)  mg/dL


 


Total Bilirubin     (0.2-1.0)  mg/dL


 


AST     (15-37)  U/L


 


ALT     (14-59)  U/L


 


Alkaline Phosphatase     ()  U/L


 


Troponin I     (0.00-0.056)  ng/mL


 


C-Reactive Protein     (<1.0)  mg/dL


 


Total Protein     (6.4-8.2)  g/dl


 


Albumin     (3.4-5.0)  g/dl


 


Globulin     gm/dL


 


Albumin/Globulin Ratio     (1-2)  


 


SARS-CoV-2 RNA (GINNA)  Negative    (NEGATIVE)  














  11/14/21 Range/Units





  13:15 


 


WBC   (3.98-10.04)  K/mm3


 


RBC   (3.98-5.22)  M/mm3


 


Hgb   (11.2-15.7)  gm/dl


 


Hct   (34.1-44.9)  %


 


MCV   (79.4-94.8)  fl


 


MCH   (25.6-32.2)  pg


 


MCHC   (32.2-35.5)  g/dl


 


RDW Std Deviation   (36.4-46.3)  fL


 


Plt Count   (182-369)  K/mm3


 


MPV   (9.4-12.3)  fl


 


Neut % (Auto)   (34.0-71.1)  %


 


Lymph % (Auto)   (19.3-51.7)  %


 


Mono % (Auto)   (4.7-12.5)  %


 


Eos % (Auto)   (0.7-5.8)  


 


Baso % (Auto)   (0.1-1.2)  %


 


Neut # (Auto)   (1.56-6.13)  K/mm3


 


Lymph # (Auto)   (1.18-3.74)  K/mm3


 


Mono # (Auto)   (0.24-0.36)  K/mm3


 


Eos # (Auto)   (0.04-0.36)  K/mm3


 


Baso # (Auto)   (0.01-0.08)  K/mm3


 


D-Dimer, Quantitative   (0.19-0.50)  mg/L


 


Sodium  144  (136-145)  mEq/L


 


Potassium  3.3 L  (3.5-5.1)  mEq/L


 


Chloride  107  ()  mEq/L


 


Carbon Dioxide  23  (21-32)  mEq/L


 


Anion Gap  17.3 H  (5-15)  


 


BUN  15  (7-18)  mg/dL


 


Creatinine  0.6  (0.55-1.02)  mg/dL


 


Est Cr Clr Drug Dosing  79.63  mL/min


 


Estimated GFR (MDRD)  > 60  (>60)  mL/min


 


BUN/Creatinine Ratio  25.0 H  (14-18)  


 


Glucose  86  (70-99)  mg/dL


 


Calcium  8.9  (8.5-10.1)  mg/dL


 


Total Bilirubin  1.1 H  (0.2-1.0)  mg/dL


 


AST  51 H  (15-37)  U/L


 


ALT  34  (14-59)  U/L


 


Alkaline Phosphatase  196 H  ()  U/L


 


Troponin I  < 0.017  (0.00-0.056)  ng/mL


 


C-Reactive Protein  2.7 H*  (<1.0)  mg/dL


 


Total Protein  5.7 L  (6.4-8.2)  g/dl


 


Albumin  2.4 L  (3.4-5.0)  g/dl


 


Globulin  3.3  gm/dL


 


Albumin/Globulin Ratio  0.7 L  (1-2)  


 


SARS-CoV-2 RNA (GINNA)   (NEGATIVE)  











Meds: 


Medications











Generic Name Dose Route Start Last Admin





  Trade Name Freq  PRN Reason Stop Dose Admin


 


Heparin Sodium/Dextrose  25,000 units in 500 mls @ 21.881 mls/hr  11/14/21 16:15

  11/14/21 16:34





  Heparin 25,000 Units In D5w 500 Ml  IV   18 units/kg/hr





  TITRATE SCARLETT   21.881 mls/hr





    Administration





  Protocol  





  18 UNITS/KG/HR  


 


Sodium Chloride  10 ml  11/14/21 12:52  11/14/21 13:23





  Sodium Chloride 0.9% 10 Ml Syringe  FLUSH   10 ml





  ASDIRECTED PRN   Administration





  Keep Vein Open  














Discontinued Medications














Generic Name Dose Route Start Last Admin





  Trade Name Freq  PRN Reason Stop Dose Admin


 


Heparin Sodium (Porcine)  5,000 units  11/14/21 16:04  11/14/21 16:34





  Heparin Sodium 5,000 Units/Ml Vial  IVPUSH  11/14/21 16:05  5,000 units





  .BOLUS ONE   Administration


 


Ceftriaxone Sodium 2 gm/  100 mls @ 200 mls/hr  11/14/21 16:31 





  Sodium Chloride  IV  11/14/21 17:00 





  ONETIME ONE  














- Re-Assessments/Exams


Free Text/Narrative Re-Assessment/Exam: 





11/14/21 13:03


Patient presents to the ER for evaluation of her acute shortness of breath again

 patient is being titrated down for oxygen and was on 8L via simple mask and was

 having O2 sats around 94 to 95% with that.  Will get some basic labs a chest x-

ray and a Covid swab was taken at the time of triage.





11/14/21 14:01


Labs have resulted, Covid screen was negative.  Patient's D-dimer is elevated at

 3.05, CRP elevated at 2.7.   CMP is fairly unremarkable otherwise no obvious 

emergent abnormalities but the potassium was slightly low at 3.3.  Troponin was 

also negative.  Have titrated the patient down to 6 L via simple mask, if she 

does well with this we will go ahead and get her switched over to a nasal 

cannula and keep trying to titrate her down until we can reach an acceptable 

level of oxygenation with minimal oxygen needs.





Departure





- Departure


Time of Disposition: 16:50


Disposition: DC/Tfer to [facility]


Condition: Good


Clinical Impression: 


 Pulmonary embolism on right, Hypoxia








- Discharge Information


Referrals: 


PCP,None [Primary Care Provider] - 


Forms:  ED Department Discharge





Sepsis Event Note (ED)





- Evaluation


Sepsis Screening Result: Possible Sepsis Risk





- Focused Exam


Vital Signs: 


                                   Vital Signs











  Temp Pulse Resp BP Pulse Ox


 


 11/14/21 12:37  97.9 F  112 H  20  90/70  79 L














- My Orders


Last 24 Hours: 


My Active Orders





11/14/21 12:51


Chest 1V Frontal [CR] Stat 





11/14/21 12:52


Oxygen Therapy, ED [RC] ASDIRECTED 


Peripheral IV Care [RC] .AS DIRECTED 


Sodium Chloride 0.9% [Saline Flush]   10 ml FLUSH ASDIRECTED PRN 


Peripheral IV Insertion Adult [OM.PC] Routine 





11/14/21 13:49


Ang Chest [CT] Stat 





11/14/21 16:00


PRO B-TYPE NATRIUR PEPT,BNPPRO [CHEM] Stat 





11/14/21 16:03


INR,PT,PROTHROMBIN TIME [COAG] Stat 


PTT,PARTIAL THROMBOPLSTIN TIME [COAG] Stat 





11/14/21 16:15


Heparin Sodium/D5W [Heparin 25,000 Units in D5W 500 ML] 25,000 units in 500 ml 

IV TITRATE 





11/14/21 22:45


aPTT [PTT,PARTIAL THROMBOPLSTIN TIME] [COAG] Stat 














- Assessment/Plan


Last 24 Hours: 


My Active Orders





11/14/21 12:51


Chest 1V Frontal [CR] Stat 





11/14/21 12:52


Oxygen Therapy, ED [RC] ASDIRECTED 


Peripheral IV Care [RC] .AS DIRECTED 


Sodium Chloride 0.9% [Saline Flush]   10 ml FLUSH ASDIRECTED PRN 


Peripheral IV Insertion Adult [OM.PC] Routine 





11/14/21 13:49


Ang Chest [CT] Stat 





11/14/21 16:00


PRO B-TYPE NATRIUR PEPT,BNPPRO [CHEM] Stat 





11/14/21 16:03


INR,PT,PROTHROMBIN TIME [COAG] Stat 


PTT,PARTIAL THROMBOPLSTIN TIME [COAG] Stat 





11/14/21 16:15


Heparin Sodium/D5W [Heparin 25,000 Units in D5W 500 ML] 25,000 units in 500 ml 

IV TITRATE 





11/14/21 22:45


aPTT [PTT,PARTIAL THROMBOPLSTIN TIME] [COAG] Stat

## 2021-11-15 NOTE — CR
Chest: Frontal view of the chest was obtained.

 

Comparison: No prior chest imaging is available.

 

Numerous nodules are seen throughout the chest.  Heart size and 

mediastinum are within normal limits.  Left-sided infusion catheter is

 seen.  Slight scoliosis is noted within the spine.

 

Impression:

1.  Diffuse nodular densities within the chest suspicious for diffuse 

metastatic disease.  Other less likely etiology could be diffuse 

granulomatous disease.

2.  Left-sided infusion catheter.

3.  Scoliosis.

 

Diagnostic code #9

## 2021-11-15 NOTE — CT
CT chest

 

Technique: Multiple axial sections were obtained from above the lung 

apices inferiorly through the lung bases.  Intravenous contrast was 

utilized.  Reconstructed coronal and sagittal images were obtained.

 

Comparison: No prior chest CT is available, prior chest x-ray 

performed earlier on the same day (1:38 PM).

 

Findings: Pulmonary arteries are well opacified.  Filling defects are 

seen within the distal right main pulmonary artery extending into the 

segmental branches of the right middle lobe and within the right upper

 lobe.  These findings are compatible with pulmonary embolism.  Heart 

shows very minimal bowing of the interventricular septum suspicious 

for minimal right ventricular strain.

 

Scattered pulmonary nodules are seen throughout both sides of the 

chest presumably due to metastatic nodules.  Largest nodule measures 

approximately 1.3 cm.  Focal density is noted within the right lung 

base which has the appearance of consolidation which most likely is 

chronic.  Emphysematous changes are also noted within both lungs.

 

Old scattered rib fractures are noted.  Scattered sclerotic metastatic

 lesions are seen within the spine and ribs.

 

Small right-sided pleural effusion is also noted.

 

Two low-density lesions are noted within the liver which most likely 

represent cysts.

 

Impression:

1.  Prominent pulmonary emboli mostly within the right main pulmonary 

artery with minimal right ventricular strain.

2.  Numerous pulmonary nodules within both lungs compatible with 

metastatic disease.  Consolidation within a portion of the right lung 

base most likely chronic.

3.  Numerous metastatic lesions are seen within the spine and ribs.  

Old bilateral rib fractures are also noted.

4.  Small pleural effusion within the right lung base.

5.  Other findings believed to be chronic as described above.

 

Diagnostic code #5

 

I agree with preliminary report from [facility], finalized on 11/14/21, 4:40

 PM CST, code 1

## 2021-12-02 ENCOUNTER — HOSPITAL ENCOUNTER (OUTPATIENT)
Dept: HOSPITAL 41 - JD.ED | Age: 71
Setting detail: OBSERVATION
LOS: 1 days | Discharge: HOSPICE | End: 2021-12-03
Attending: FAMILY MEDICINE | Admitting: FAMILY MEDICINE
Payer: COMMERCIAL

## 2021-12-02 DIAGNOSIS — E87.6: ICD-10-CM

## 2021-12-02 DIAGNOSIS — C34.91: ICD-10-CM

## 2021-12-02 DIAGNOSIS — D69.6: ICD-10-CM

## 2021-12-02 DIAGNOSIS — Z20.822: ICD-10-CM

## 2021-12-02 DIAGNOSIS — M25.551: Primary | ICD-10-CM

## 2021-12-02 DIAGNOSIS — Z87.891: ICD-10-CM

## 2021-12-02 DIAGNOSIS — C79.51: ICD-10-CM

## 2021-12-02 DIAGNOSIS — I26.99: ICD-10-CM

## 2021-12-02 DIAGNOSIS — G89.3: ICD-10-CM
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
## 2021-12-02 DIAGNOSIS — C79.31: ICD-10-CM

## 2021-12-02 DIAGNOSIS — Z79.899: ICD-10-CM

## 2021-12-02 DIAGNOSIS — Z90.49: ICD-10-CM

## 2021-12-02 DIAGNOSIS — Z98.890: ICD-10-CM

## 2021-12-02 DIAGNOSIS — Z79.01: ICD-10-CM

## 2021-12-02 DIAGNOSIS — E78.00: ICD-10-CM

## 2021-12-02 DIAGNOSIS — C78.7: ICD-10-CM

## 2021-12-02 PROCEDURE — U0002 COVID-19 LAB TEST NON-CDC: HCPCS

## 2021-12-02 PROCEDURE — 36415 COLL VENOUS BLD VENIPUNCTURE: CPT

## 2021-12-02 PROCEDURE — 80053 COMPREHEN METABOLIC PANEL: CPT

## 2021-12-02 PROCEDURE — 99285 EMERGENCY DEPT VISIT HI MDM: CPT

## 2021-12-02 PROCEDURE — 73501 X-RAY EXAM HIP UNI 1 VIEW: CPT

## 2021-12-02 PROCEDURE — 85025 COMPLETE CBC W/AUTO DIFF WBC: CPT

## 2021-12-02 PROCEDURE — 73552 X-RAY EXAM OF FEMUR 2/>: CPT

## 2021-12-02 PROCEDURE — 87635 SARS-COV-2 COVID-19 AMP PRB: CPT

## 2021-12-02 PROCEDURE — 70450 CT HEAD/BRAIN W/O DYE: CPT

## 2021-12-02 PROCEDURE — G0378 HOSPITAL OBSERVATION PER HR: HCPCS

## 2021-12-02 RX ADMIN — APIXABAN SCH MG: 5 TABLET, FILM COATED ORAL at 21:12

## 2021-12-02 NOTE — EDM.PDOC
ED HPI GENERAL MEDICAL PROBLEM





- General


Chief Complaint: Trauma


Stated Complaint: [facility]


Time Seen by Provider: 21 07:20





- History of Present Illness


INITIAL COMMENTS - FREE TEXT/NARRATIVE: 





71-year-old female presents the emergency room brought in by EMS with right hip 

pain.





Last night approximately 9 PM the patient fell.  She thinks she hit her head.  

She is on Eliquis for blood clot.  Her  was able to help her up and get 

into bed but this morning she is too tender to really get up and walk.  Patient 

denies any other injury associated with this most unfortunate event.  Patient is

currently being treated for stage IV lung cancer.  This was in remission but 

showed up again in her brain and in her spine.  For pain the patient uses Norco 

on occasion.  She is really not complaining of any pain at this time but it does

hurt if she tries to move.  She was recently diagnosed with a blood clot in her 

lungs.  She is taking Eliquis for this.





- Related Data


                                    Allergies











Allergy/AdvReac Type Severity Reaction Status Date / Time


 


No Known Allergies Allergy   Verified 21 08:43











Home Meds: 


                                    Home Meds





dexAMETHasone [Dexamethasone] 8 mg PO DAILY 21 [History]


Apixaban [Eliquis] 5 mg PO DAILY 21 [History]











Past Medical History


HEENT History: Reports: Impaired Vision


Cardiovascular History: Reports: High Cholesterol, None


Respiratory History: Reports: SOB


Gastrointestinal History: Reports: None


OB/GYN History: Reports: Endometriosis, Other (See Below), Polycystic Ovaries


Other OB/GYN History: hysterectomy


Musculoskeletal History: Reports: Other (See Below), Osteoarthritis


Neurological History: Reports: Other (See Below)


Endocrine/Metabolic History: Reports: None


Hematologic History: Reports: None


Immunologic History: Reports: None


Oncologic (Cancer) History: Reports: Brain, Liver, Lung, Metastatic


Other Oncologic History: right lung cancer diagnosed in 2017 that has spread to 

brain and spine





- Past Surgical History


Other Oncologic Surgeries/Procedures: not going through any treatments - quit 

treatments in 





Social & Family History





- Family History


Other Respiratory Family Hisory: Brother  of lung CA


Other Dermatologic Family History: "Maternal family all has cancer"





- Caffeine Use


Caffeine Use: Reports: None





- Living Situation & Occupation


Living situation: Reports: , with Spouse


Occupation: Disabled





Review of Systems





- Review of Systems


Review Of Systems: See Below


Constitutional: Reports: No Symptoms


Eyes: Reports: No Symptoms


Ears: Reports: No Symptoms


Nose: Reports: No Symptoms


Mouth/Throat: Reports: No Symptoms


Respiratory: Reports: No Symptoms.  Denies: Hemoptysis


Cardiovascular: Reports: No Symptoms


GI/Abdominal: Reports: No Symptoms


Genitourinary: Reports: No Symptoms


Musculoskeletal: Reports: Leg Pain


Skin: Reports: No Symptoms


Neurological: Reports: No Symptoms





ED EXAM, GENERAL





- Physical Exam


Exam: See Below


Exam Limited By: No Limitations


General Appearance: Alert, No Apparent Distress


Head: Atraumatic, Normocephalic


Neck: Normal Inspection, Supple, Non-Tender, Full Range of Motion


Respiratory/Chest: No Respiratory Distress, Lungs Clear, No Accessory Muscle 

Use, Chest Non-Tender, Other (Initially she had some bibasilar crackles these 

cleared with deep breathing otherwise no abnormal breath sounds).  No: Normal 

Breath Sounds, Respiratory Distress


Cardiovascular: Regular Rate, Rhythm, No Edema, No Murmur


GI/Abdominal: Normal Bowel Sounds, Soft, Non-Tender


Back Exam: Normal Inspection


Extremities: Leg Pain (She has some right mid thigh pain no obvious deformity), 

Other (No shortening or external rotation neurovascular status grossly intact)


Neurological: Alert, Oriented, Normal Cognition





Course





- Vital Signs


Last Recorded V/S: 


                                Last Vital Signs











Temp  36.4 C   21 07:30


 


Pulse  99   21 07:30


 


Resp  20   21 07:30


 


BP  97/71   21 07:30


 


Pulse Ox  94 L  21 07:30














- Orders/Labs/Meds


Orders: 


                               Active Orders 24 hr











 Category Date Time Status


 


 Consult to Case Management/ [CONS] Cons  21 10:06 Active





 Routine   


 


 UA RFX LUIS AND CULT IF INDIC [URIN] Stat Lab  21 10:01 Ordered











Labs: 


                                Laboratory Tests











  21 Range/Units





  07:55 07:55 11:16 


 


WBC  9.56    (3.98-10.04)  K/mm3


 


RBC  3.35 L    (3.98-5.22)  M/mm3


 


Hgb  11.4    (11.2-15.7)  gm/dl


 


Hct  36.1    (34.1-44.9)  %


 


MCV  107.8 H    (79.4-94.8)  fl


 


MCH  34.0 H    (25.6-32.2)  pg


 


MCHC  31.6 L    (32.2-35.5)  g/dl


 


RDW Std Deviation  65.5 H    (36.4-46.3)  fL


 


Plt Count  180 L    (182-369)  K/mm3


 


MPV  10.4    (9.4-12.3)  fl


 


Neut % (Auto)  80.3 H    (34.0-71.1)  %


 


Lymph % (Auto)  15.1 L    (19.3-51.7)  %


 


Mono % (Auto)  3.8 L    (4.7-12.5)  %


 


Eos % (Auto)  0 L    (0.7-5.8)  


 


Baso % (Auto)  0.1    (0.1-1.2)  %


 


Neut # (Auto)  7.68 H    (1.56-6.13)  K/mm3


 


Lymph # (Auto)  1.44    (1.18-3.74)  K/mm3


 


Mono # (Auto)  0.36    (0.24-0.36)  K/mm3


 


Eos # (Auto)  0.00 L    (0.04-0.36)  K/mm3


 


Baso # (Auto)  0.01    (0.01-0.08)  K/mm3


 


Sodium   144   (136-145)  mEq/L


 


Potassium   3.3 L   (3.5-5.1)  mEq/L


 


Chloride   107   ()  mEq/L


 


Carbon Dioxide   23   (21-32)  mEq/L


 


Anion Gap   17.3 H   (5-15)  


 


BUN   16   (7-18)  mg/dL


 


Creatinine   0.5 L   (0.55-1.02)  mg/dL


 


Est Cr Clr Drug Dosing   TNP   


 


Estimated GFR (MDRD)   > 60   (>60)  mL/min


 


BUN/Creatinine Ratio   32.0 H   (14-18)  


 


Glucose   105 H   (70-99)  mg/dL


 


Calcium   9.0   (8.5-10.1)  mg/dL


 


Total Bilirubin   0.9   (0.2-1.0)  mg/dL


 


AST   53 H   (15-37)  U/L


 


ALT   41   (14-59)  U/L


 


Alkaline Phosphatase   346 H   ()  U/L


 


Total Protein   5.5 L   (6.4-8.2)  g/dl


 


Albumin   2.3 L   (3.4-5.0)  g/dl


 


Globulin   3.2   gm/dL


 


Albumin/Globulin Ratio   0.7 L   (1-2)  


 


SARS-CoV-2 RNA (GINNA)    Negative  (NEGATIVE)  











Meds: 


Medications














Discontinued Medications














Generic Name Dose Route Start Last Admin





  Trade Name Wally  PRN Reason Stop Dose Admin


 


Lactated Ringer's  1,000 mls @ 999 mls/hr  21 10:01  21 10:37





  Ringers, Lactated  IV  21 11:01  999 mls/hr





  .BOLUS ONE   Administration


 


Potassium Chloride  20 meq  21 12:41 





  Potassium Chloride 20 Meq Tab.Er  PO  21 12:42 





  ONETIME ONE  














- Re-Assessments/Exams


Free Text/Narrative Re-Assessment/Exam: 





21 10:02


X-ray examination showed tumor involvement in the pelvis and proximal femur.  

Did discuss this with the patient and they were not aware of knowing the tumor 

in this area.  I did discuss it with Dr. Christopher the patient's oncologist's to

 inform you back in  the patient decided not to pursue cancer treatment any 

longer.  I did confirm this with the patient sure enough this is a situation 

when the patient was in [place] and got discharged short time ago they were 

trying to find skilled nursing placement for the patient but could not come up 

with anything.  I have reviewed the situation with the patient and her  

the patient is a hospice candidate she would rather stay at home and think this 

is reasonable.  We will have social work come and discuss the situation with the

 patient and see if she can facilitate further treatment the patient thinks a 

lot of her problems are related just to being weak this is probably why she fell

 and why she does not really feel like she can get up and walk she is can to try

 and eat something.  I will give her some Tylenol just in case she has some 

discomfort and see if we can ambulate if she starts feeling better.


21 12:46


Patient was evaluated by social work, [facility] has discussed it with hospice but 

hospice cannot help until tomorrow.  The patient's case was discussed with Dr. Turk, hospitalist patient will be placed in observation awaiting hospice to be

 put together.





Departure





- Departure


Time of Disposition: 12:45


Disposition: Refer to Observation


Clinical Impression: 


 Failure to thrive, Metastatic cancer to bone, Metastatic cancer to brain








- Discharge Information


Referrals: 


PCP,None [Ordering Only Provider] - 


Forms:  ED Department Discharge





Sepsis Event Note (ED)





- Focused Exam


Vital Signs: 


                                   Vital Signs











  Temp Pulse Resp BP Pulse Ox


 


 21 07:30  36.4 C  99  20  97/71  94 L














- My Orders


Last 24 Hours: 


My Active Orders





21 10:01


UA RFX LUIS AND CULT IF INDIC [URIN] Stat 





21 10:06


Consult to Case Management/ [CONS] Routine 














- Assessment/Plan


Last 24 Hours: 


My Active Orders





21 10:01


UA RFX LUIS AND CULT IF INDIC [URIN] Stat 





21 10:06


Consult to Case Management/ [CONS] Routine

## 2021-12-02 NOTE — PCM.HP.2
H&P History of Present Illness





- General


Date of Service: 21


Source of Information: Patient, Old Records, Provider, RN, RN Notes Reviewed, 

Significant Other


History Limitations: Reports: No Limitations





- History of Present Illness


Initial Comments - Free Text/Narative: 


This is a 71-year-old female who presents to ED on 2021 via [facility] with right hip pain after a fall.  Patient reportedly fell at approxim

ately 9 PM last night.  At that time her  was able to help her get back 

into bed but since then she has become more weak and continues to have pain.  

Believes she may have hit her head but denies any other injury or pain.  She 

does have a history of stage IV lung cancer which has metastasized to her liver 

and brain.  She reports she uses Norco for pain on occasion.  She was also 

recently diagnosed with a PE and is on Eliquis.  She is taking dexamethasone as 

well due to her brain tumors.





In the ED temp was 36.4 Celsius.  Pulse 99.  Respirations 20.  Blood pressure 

97/71.  Pulse ox 94% on room air.  She did complain of leg pain but there is no 

obvious rotation or shortening.  Labs are obtained showing a WBC of 9.56.  

Hemoglobin 11.4.  Platelet 180,000.  Neutrophils are elevated 80.3%.  Sodium is 

144.  Potassium is 3.3.  Chloride 107.  Carbon dioxide 23.  Anion gap is 17.3.  

BUN is 16.  Creatinine 0.5.  GFR is greater than 60.  Glucose is 105.  Calcium 

0.9.  Bilirubin 0.9.  AST is 53, ALT 41, alkaline phosphatase 346.  Protein is 

5.5.  Albumin 2.3.  SARS-CoV-2 RNA is negative.  Head CT is obtained showing 

mucosal thickening within both maxillary sinuses which are most likely due to 

chronic sinusitis and senescent change but no acute abnormality is noted.  Right

hip and pelvis x-rays obtained showing scattered sclerotic areas within the 

pelvis as well as proximal femurs which are suspicious for osseous metastatic 

disease.  Bony structures are otherwise osteopenic but no acute abnormality is 

appreciated.  Right femur x-rays obtained showing sclerotic areas within the pro

ximal femur and adjacent pelvis with findings suspicious for possible metastatic

disease.  There is mild medial joint space narrowing within the knee but no 

acute abnormalities otherwise appreciated.  Dr. Christopher, the patient's 

oncologist is contacted by the ED provider who informs us that the patient 

stopped cancer treatment this past January.  The patient was reportedly in the 

[facility] and they were trying to come up with skilled nursing placement

but could not find anything.  The patient now would like hospice care and to 

return home.   [facility] does begin the process for hospice admission 

however nothing will be able to be finished until tomorrow.  Plan is to admit t

he patient observation status with likely discharge tomorrow once hospice is 

arranged.





She carries a history of HLD, endometriosis, polycystic ovarian disease, 

osteoarthritis, lung cancer with mets to her brain, liver, and bone.  She is a 

former smoker.  Her PCP is Dr. Kaur.  She wishes to be DNR/DNI/comfort care.








- Related Data


Allergies/Adverse Reactions: 


                                    Allergies











Allergy/AdvReac Type Severity Reaction Status Date / Time


 


No Known Allergies Allergy   Verified 21 08:43











Home Medications: 


                                    Home Meds





dexAMETHasone [Dexamethasone] 8 mg PO DAILY 21 [History]


Apixaban [Eliquis] 5 mg PO DAILY 21 [History]











Past Medical History


HEENT History: Reports: Impaired Vision


Cardiovascular History: Reports: High Cholesterol


Respiratory History: Reports: PE, SOB


Gastrointestinal History: Reports: None


OB/GYN History: Reports: Endometriosis, Other (See Below), Polycystic Ovaries


Other OB/BYN History: hysterectomy


Musculoskeletal History: Reports: Osteoarthritis


Neurological History: Reports: Other (See Below)


Endocrine/Metabolic History: Reports: None


Hematologic History: Reports: None


Immunologic History: Reports: None


Oncologic (Cancer) History: Reports: Brain, Liver, Lung, Metastatic


Other Oncologic History: right lung cancer diagnosed in 2017 that has spread to 

brain and spine





- Past Surgical History


HEENT Surgical History: Reports: Adenoidectomy, Tonsillectomy


Respiratory Surgical History: Reports: Lung Biopsies


GI Surgical History: Reports: Colonoscopy


Other Oncologic Surgeries/Procedures: not going through any treatments - quit t

reatments in 





Social & Family History





- Family History


Family Medical History: No Pertinent Family History


Other Respiratory Family Hisory: Brother  of lung CA


Other Dermatologic Family History: "Maternal family all has cancer"





- Tobacco Use


Tobacco Use Status *Q: Former Tobacco User


Used Tobacco, but Quit: Yes


Month/Year Tobacco Last Used: 1990





- Caffeine Use


Caffeine Use: Reports: None





- Recreational Drug Use


Recreational Drug Use: No





- Living Situation & Occupation


Living situation: Reports: , with Spouse


Occupation: Disabled





H&P Review of Systems





- Review of Systems:


Review Of Systems: See Below


General: Reports: Weakness.  Denies: Fever, Chills, Malaise, Fatigue


HEENT: Reports: No Symptoms.  Denies: Headaches, Sore Throat


Pulmonary: Reports: No Symptoms.  Denies: Shortness of Breath, Wheezing, 

Pleuritic Chest Pain, Cough, Sputum


Cardiovascular: Reports: Edema.  Denies: Chest Pain, Palpitations, Dyspnea on 

Exertion


Gastrointestinal: Reports: No Symptoms.  Denies: Abdominal Pain, Constipation, 

Diarrhea, Nausea, Vomiting


Genitourinary: Reports: No Symptoms.  Denies: Pain


Musculoskeletal: Reports: Back Pain, Leg Pain (right ), Joint Pain (right hip)


Skin: Reports: No Symptoms.  Denies: Cyanosis


Psychiatric: Reports: No Symptoms.  Denies: Confusion


Neurological: Reports: No Symptoms, Difficulty Walking, Weakness, Gait 

Disturbance.  Denies: Confusion, Dizziness, Headache, Numbness, Seizure, Syn

cope, Tingling, Tremors, Trouble Speaking


Hematologic/Lymphatic: Reports: No Symptoms


Immunologic: Reports: No Symptoms





Exam





- Exam


Exam: See Below





- Vital Signs


Vital Signs: 


                                Last Vital Signs











Temp  97.5 F   21 07:30


 


Pulse  99   21 07:30


 


Resp  20   21 07:30


 


BP  97/71   21 07:30


 


Pulse Ox  94 L  21 07:30














- Exam


Quality Assessment: No: Supplemental Oxygen, Urinary Catheter, DVT Prophylaxis 

(Contraindicated due to comfort care)


General: Alert, Oriented, Cooperative, Mild Distress


HEENT: Conjunctiva Clear, EACs Clear, Posterior Pharynx Clear.  No: Mucosa Moist

& Pink (Dry)


Neck: Supple, Trachea Midline


Lungs: Clear to Auscultation, Normal Respiratory Effort


Cardiovascular: Regular Rate, Regular Rhythm


GI/Abdominal Exam: Normal Bowel Sounds, Soft, Non-Tender, No Distention


 (Female) Exam: Deferred


Rectal (Female) Exam: Deferred


Back Exam: Normal Inspection, Full Range of Motion


Extremities: Normal Inspection, Normal Range of Motion, Normal Capillary Refill,

Pedal Edema (1+ on right trace on left), Leg Pain (Right hip pain)


Skin: Warm, Dry, Intact


Neurological: Cranial Nerves Intact (Grossly)


Neuro Extensive - Mental Status: Alert, Oriented x3





- Patient Data


Lab Results Last 24 hrs: 


                         Laboratory Results - last 24 hr











  21 Range/Units





  07:55 07:55 11:16 


 


WBC  9.56    (3.98-10.04)  K/mm3


 


RBC  3.35 L    (3.98-5.22)  M/mm3


 


Hgb  11.4    (11.2-15.7)  gm/dl


 


Hct  36.1    (34.1-44.9)  %


 


MCV  107.8 H    (79.4-94.8)  fl


 


MCH  34.0 H    (25.6-32.2)  pg


 


MCHC  31.6 L    (32.2-35.5)  g/dl


 


RDW Std Deviation  65.5 H    (36.4-46.3)  fL


 


Plt Count  180 L    (182-369)  K/mm3


 


MPV  10.4    (9.4-12.3)  fl


 


Neut % (Auto)  80.3 H    (34.0-71.1)  %


 


Lymph % (Auto)  15.1 L    (19.3-51.7)  %


 


Mono % (Auto)  3.8 L    (4.7-12.5)  %


 


Eos % (Auto)  0 L    (0.7-5.8)  


 


Baso % (Auto)  0.1    (0.1-1.2)  %


 


Neut # (Auto)  7.68 H    (1.56-6.13)  K/mm3


 


Lymph # (Auto)  1.44    (1.18-3.74)  K/mm3


 


Mono # (Auto)  0.36    (0.24-0.36)  K/mm3


 


Eos # (Auto)  0.00 L    (0.04-0.36)  K/mm3


 


Baso # (Auto)  0.01    (0.01-0.08)  K/mm3


 


Sodium   144   (136-145)  mEq/L


 


Potassium   3.3 L   (3.5-5.1)  mEq/L


 


Chloride   107   ()  mEq/L


 


Carbon Dioxide   23   (21-32)  mEq/L


 


Anion Gap   17.3 H   (5-15)  


 


BUN   16   (7-18)  mg/dL


 


Creatinine   0.5 L   (0.55-1.02)  mg/dL


 


Est Cr Clr Drug Dosing   TNP   


 


Estimated GFR (MDRD)   > 60   (>60)  mL/min


 


BUN/Creatinine Ratio   32.0 H   (14-18)  


 


Glucose   105 H   (70-99)  mg/dL


 


Calcium   9.0   (8.5-10.1)  mg/dL


 


Total Bilirubin   0.9   (0.2-1.0)  mg/dL


 


AST   53 H   (15-37)  U/L


 


ALT   41   (14-59)  U/L


 


Alkaline Phosphatase   346 H   ()  U/L


 


Total Protein   5.5 L   (6.4-8.2)  g/dl


 


Albumin   2.3 L   (3.4-5.0)  g/dl


 


Globulin   3.2   gm/dL


 


Albumin/Globulin Ratio   0.7 L   (1-2)  


 


SARS-CoV-2 RNA ([facility])    Negative  (NEGATIVE)  











Result Diagrams: 


                                 21 07:55





                                 21 07:55





Sepsis Event Note





- Evaluation


Sepsis Screening Result: No Definite Risk





- Focused Exam


Vital Signs: 


                                   Vital Signs











  Temp Pulse Resp BP Pulse Ox


 


 21 07:30  97.5 F  99  20  97/71  94 L














- Problem List


(1) End of life care


SNOMED Code(s): 451569624, 669399323


   ICD Code: Z51.5 - ENCOUNTER FOR PALLIATIVE CARE   Status: Acute   Current 

Visit: Yes   





(2) Hypokalemia


SNOMED Code(s): 65166760


   ICD Code: E87.6 - HYPOKALEMIA   Status: Acute   Priority: Medium   Current 

Visit: Yes   





(3) Thrombocytopenia


SNOMED Code(s): 886436584


   ICD Code: D69.6 - THROMBOCYTOPENIA, UNSPECIFIED   Status: Acute   Priority: 

Low   Current Visit: Yes   





(4) Generalized weakness


SNOMED Code(s): 10328437


   ICD Code: R53.1 - WEAKNESS   Status: Acute   Priority: High   Current Visit: 

Yes   





(5) Frequent falls


SNOMED Code(s): 985862560


   ICD Code: R29.6 - REPEATED FALLS   Status: Acute   Priority: High   Current 

Visit: Yes   





(6) Failure to thrive


SNOMED Code(s): 47431999


   ICD Code: ELU7237 -    Status: Acute   Priority: High   Current Visit: Yes   


Qualifiers: 


   Failure to thrive age range: in adult   Qualified Code(s): R62.7 - Adult 

failure to thrive   





(7) Metastatic cancer to bone


Status: Chronic   Priority: High   Current Visit: Yes   





(8) Metastatic cancer to brain


Status: Chronic   Priority: High   Current Visit: Yes   





(9) Chronic pain due to malignant neoplastic disease


SNOMED Code(s): 191622912


   ICD Code: G89.3 - NEOPLASM RELATED PAIN (ACUTE) (CHRONIC)   Status: Chronic  

Priority: High   Current Visit: Yes   





(10) Primary cancer of right lung metastatic to other site


SNOMED Code(s): 171894995, 321740275


   ICD Code: C34.91 - MALIGNANT NEOPLASM OF UNSP PART OF RIGHT BRONCHUS OR LUNG 

 Status: Chronic   Priority: High   Current Visit: Yes   





(11) Pulmonary embolism on right


SNOMED Code(s): 66767332


   ICD Code: I26.99 - OTHER PULMONARY EMBOLISM WITHOUT ACUTE COR PULMONALE   

Status: Chronic   Priority: Medium   Current Visit: Yes   





(12) Metastatic cancer to liver


Status: Chronic   Priority: High   Current Visit: Yes   





(13) HLD (hyperlipidemia)


SNOMED Code(s): 34780917


   ICD Code: E78.5 - HYPERLIPIDEMIA, UNSPECIFIED   Status: Chronic   Priority: 

Low   Current Visit: No   


Qualifiers: 


   Hyperlipidemia type: unspecified   Qualified Code(s): E78.5 - Hyperlipidemia,

unspecified   





(14) Endometriosis


SNOMED Code(s): 157424230


   ICD Code: N80.9 - ENDOMETRIOSIS, UNSPECIFIED   Status: Chronic   Priority: 

Low   Current Visit: No   





(15) PCOD (polycystic ovarian disease)


SNOMED Code(s): 448459635


   ICD Code: E28.2 - POLYCYSTIC OVARIAN SYNDROME   Status: Chronic   Priority: 

Low   Current Visit: No   





(16) Osteoarthritis


SNOMED Code(s): 460832872


   ICD Code: M19.90 - UNSPECIFIED OSTEOARTHRITIS, UNSPECIFIED SITE   Status: 

Chronic   Priority: Medium   Current Visit: Yes   


Qualifiers: 


   Osteoarthritis location: unspecified site   Osteoarthritis type: primary   

Qualified Code(s): M19.91 - Primary osteoarthritis, unspecified site   





(17) Former smoker


SNOMED Code(s): 8055336


   ICD Code: Z87.891 - PERSONAL HISTORY OF NICOTINE DEPENDENCE   Status: Chronic

  Priority: Medium   Current Visit: No   


Problem List Initiated/Reviewed/Updated: Yes


Orders Last 24hrs: 


                               Active Orders 24 hr











 Category Date Time Status


 


 Consult to Case Management/ [CONS] Cons  21 10:06 Active





 Routine   


 


 UA RFX LUIS AND CULT IF INDIC [URIN] Stat Lab  12/02/21 10:01 Ordered











Assessment/Plan Comment:: 


End of life care


Generalized weakness


Frequent falls


Failure to thrive


Metastatic cancer to bone


Metastatic cancer to brain


Chronic pain due to malignant neoplastic disease


Primary cancer of right lung metastatic to other site


Metastatic cancer to liver


Osteoarthritis


Former smoker


* CM/SW consultation


* Hospice consultation


* Pain medications as ordered


* Ativan for anxiety


* Spiritual care consultation


* Every shift vital signs


* No further lab draws/treatment, no VTE prophylaxis, no telemetry


* Bedrest with bedside commode


* Continue home dexamethasone


* Regular diet





Pulmonary embolism on right


* Patient wishes to continue on her Eliquis


* Home Eliquis as ordered





Inactive:


Hypokalemia


* Supplemented in ED


Thrombocytopenia


HLD (hyperlipidemia)


Endometriosis


PCOD (polycystic ovarian disease)





Code status: DNR/DNI/Comfort care


PCP: Dr. Kaur


VTE prophylaxis: Not indicated


Social: Patient resides at home with her .  She reports they have 12 

steps to get into their house.


Disposition: Patient admitted to the medical floor observation status due to 

generalized weakness and failure to thrive secondary to metastatic cancer.  

Patient is pursuing hospice care at this time and will be discharged once these 

arrangements are made.  Length of stay likely 1 to 2 days.





- Mortality Measure


Prognosis:: Poor (Comfort care patient with hospice consult.  Life expectancy 

less than 6 months.)

## 2021-12-02 NOTE — CR
Right femur: AP and lateral views of the right femur were obtained.

 

Comparison: No previous femur study is available.

 

Sclerotic areas are seen within the proximal femur and within the 

adjacent pelvis.  Bony structures are also osteopenic.  Mild joint 

space narrowing is seen within the medial compartment of the knee.  No

 acute fracture or other bony abnormality is appreciated.

 

Impression:

1.  Sclerotic areas within the proximal femur and adjacent pelvis.  

Findings are suspicious for possible metastatic disease.

2.  Mild medial joint space narrowing within the knee.

3.  No acute abnormality is otherwise appreciated.

 

Diagnostic code #9

## 2021-12-02 NOTE — CR
Pelvis and right hip: AP view of the pelvis was obtained as well as 

frog-leg lateral view of the right hip.

 

Comparison: No prior hip or pelvis imaging is available.

 

Scattered sclerotic areas are seen within the pelvis as well as both 

proximal femurs.  Joint spaces within both hips are maintained.  Bony 

structures are also osteopenic.  No fracture or other bony abnormality

 is appreciated.

 

Impression:

1.  Scattered sclerotic areas within the pelvis as well as proximal 

femurs which are suspicious for osseous metastatic disease.

2.  Bony structures are otherwise osteopenic.  No acute abnormality is

 otherwise appreciated.

 

Diagnostic code #9

## 2021-12-02 NOTE — CT
Head CT

 

Technique: Multiple axial sections through the brain were obtained.  

Intravenous contrast was not utilized.  Reconstructed coronal and 

sagittal images were obtained.

 

Comparison: No prior intracranial imaging is available.

 

Findings: Ventricles along with basal cisterns and sulci over the 

convexities are mildly prominent.  Well-defined low density areas are 

seen within both basal ganglia which are due to prominent perivascular

 spaces or old lacunar infarcts.  No other abnormal parenchymal 

densities are seen.  No evidence of intracranial hemorrhage is seen.  

No midline shift or mass-effect is seen.

 

Bone window settings were reviewed.  Mucosal thickening is noted 

within both maxillary sinuses.  Mild mucosal thickening is noted 

within the right mastoid sinus.  No acute calvarial abnormality is 

seen.

 

Impression:

1.  Mucosal thickening within both maxillary sinuses.  These are most 

likely due to chronic sinusitis.

2.  Senescent change as noted above.

3.  No acute intracranial abnormality is seen.

 

Diagnostic code #2

## 2021-12-03 RX ADMIN — APIXABAN SCH MG: 5 TABLET, FILM COATED ORAL at 08:15

## 2021-12-03 NOTE — [id number]
**Discharge Summary





- Hospital Course


HPI Initial Comments: 


This is a 71-year-old female who presents to ED on 12/2/2021 via [facility] with right hip pain after a fall.  Patient reportedly fell at 

approximately 9 PM last night.  At that time her  was able to help her 

get back into bed but since then she has become more weak and continues to have 

pain.  Believes she may have hit her head but denies any other injury or pain.  

She does have a history of stage IV lung cancer which has metastasized to her 

liver and brain.  She reports she uses Norco for pain on occasion.  She was also

recently diagnosed with a PE and is on Eliquis.  She is taking dexamethasone as 

well due to her brain tumors.





In the ED temp was 36.4 Celsius.  Pulse 99.  Respirations 20.  Blood pressure 

97/71.  Pulse ox 94% on room air.  She did complain of leg pain but there is no 

obvious rotation or shortening.  Labs are obtained showing a WBC of 9.56.  

Hemoglobin 11.4.  Platelet 180,000.  Neutrophils are elevated 80.3%.  Sodium is 

144.  Potassium is 3.3.  Chloride 107.  Carbon dioxide 23.  Anion gap is 17.3.  

BUN is 16.  Creatinine 0.5.  GFR is greater than 60.  Glucose is 105.  Calcium 

0.9.  Bilirubin 0.9.  AST is 53, ALT 41, alkaline phosphatase 346.  Protein is 

5.5.  Albumin 2.3.  SARS-CoV-2 RNA is negative.  Head CT is obtained showing 

mucosal thickening within both maxillary sinuses which are most likely due to 

chronic sinusitis and senescent change but no acute abnormality is noted.  Right

hip and pelvis x-rays obtained showing scattered sclerotic areas within the 

pelvis as well as proximal femurs which are suspicious for osseous metastatic 

disease.  Bony structures are otherwise osteopenic but no acute abnormality is 

appreciated.  Right femur x-rays obtained showing sclerotic areas within the 

proximal femur and adjacent pelvis with findings suspicious for possible 

metastatic disease.  There is mild medial joint space narrowing within the knee 

but no acute abnormalities otherwise appreciated.  Dr. Christopher, the patient's 

oncologist is contacted by the ED provider who informs us that the patient 

stopped cancer treatment this past June.  The patient was reportedly in the 

[facility] and they were trying to come up with skilled nursing placement

but could not find anything.  The patient now would like hospice care and to 

return home.   [facility] does begin the process for hospice admission 

however nothing will be able to be finished until tomorrow.  Plan is to admit 

the patient observation status with likely discharge tomorrow once hospice is 

arranged.





She carries a history of HLD, endometriosis, polycystic ovarian disease, 

osteoarthritis, lung cancer with mets to her brain, liver, and bone.  She is a 

former smoker.  Her PCP is Dr. Kaur.  She wishes to be DNR/DNI/comfort care.





Diagnosis: Stroke: No





- Discharge Data


Discharge Date: 12/03/21 (Admit date: 12/2/2021)


Discharge Disposition: DC/Tfer to Hospice - Home 50


Condition: Stable





- Referral to Home Health


Primary Care Physician: 


Fritz Kaur MD








- Discharge Diagnosis/Problem(s)


(1) End of life care


SNOMED Code(s): 790586333, 884288300


   ICD Code: Z51.5 - ENCOUNTER FOR PALLIATIVE CARE   Status: Acute   Current 

Visit: Yes   





(2) Hypokalemia


SNOMED Code(s): 77861883


   ICD Code: E87.6 - HYPOKALEMIA   Status: Acute   Priority: Medium   Current 

Visit: Yes   





(3) Thrombocytopenia


SNOMED Code(s): 406195743


   ICD Code: D69.6 - THROMBOCYTOPENIA, UNSPECIFIED   Status: Acute   Priority: 

Low   Current Visit: Yes   





(4) Generalized weakness


SNOMED Code(s): 25538509


   ICD Code: R53.1 - WEAKNESS   Status: Acute   Priority: High   Current Visit: 

Yes   





(5) Frequent falls


SNOMED Code(s): 488309758


   ICD Code: R29.6 - REPEATED FALLS   Status: Acute   Priority: High   Current 

Visit: Yes   





(6) Failure to thrive


SNOMED Code(s): 71877387


   ICD Code: YCN0291 -    Status: Acute   Priority: High   Current Visit: Yes   


Qualifiers: 


   Failure to thrive age range: in adult   Qualified Code(s): R62.7 - Adult 

failure to thrive   





(7) Metastatic cancer to bone


Status: Chronic   Priority: High   Current Visit: Yes   





(8) Metastatic cancer to brain


Status: Chronic   Priority: High   Current Visit: Yes   





(9) Chronic pain due to malignant neoplastic disease


SNOMED Code(s): 341796265


   ICD Code: G89.3 - NEOPLASM RELATED PAIN (ACUTE) (CHRONIC)   Status: Chronic  

Priority: High   Current Visit: Yes   





(10) Primary cancer of right lung metastatic to other site


SNOMED Code(s): 891602205, 825842904


   ICD Code: C34.91 - MALIGNANT NEOPLASM OF UNSP PART OF RIGHT BRONCHUS OR LUNG 

 Status: Chronic   Priority: High   Current Visit: Yes   





(11) Pulmonary embolism on right


SNOMED Code(s): 05538332


   ICD Code: I26.99 - OTHER PULMONARY EMBOLISM WITHOUT ACUTE COR PULMONALE   

Status: Chronic   Priority: Medium   Current Visit: Yes   





(12) Metastatic cancer to liver


Status: Chronic   Priority: High   Current Visit: Yes   





(13) HLD (hyperlipidemia)


SNOMED Code(s): 40495973


   ICD Code: E78.5 - HYPERLIPIDEMIA, UNSPECIFIED   Status: Chronic   Priority: 

Low   Current Visit: No   


Qualifiers: 


   Hyperlipidemia type: unspecified   Qualified Code(s): E78.5 - Hyperlipidemia,

unspecified   





(14) Endometriosis


SNOMED Code(s): 378765525


   ICD Code: N80.9 - ENDOMETRIOSIS, UNSPECIFIED   Status: Chronic   Priority: 

Low   Current Visit: No   





(15) PCOD (polycystic ovarian disease)


SNOMED Code(s): 203704536


   ICD Code: E28.2 - POLYCYSTIC OVARIAN SYNDROME   Status: Chronic   Priority: 

Low   Current Visit: No   





(16) Osteoarthritis


SNOMED Code(s): 782932614


   ICD Code: M19.90 - UNSPECIFIED OSTEOARTHRITIS, UNSPECIFIED SITE   Status: 

Chronic   Priority: Medium   Current Visit: Yes   


Qualifiers: 


   Osteoarthritis location: unspecified site   Osteoarthritis type: primary   

Qualified Code(s): M19.91 - Primary osteoarthritis, unspecified site   





(17) Former smoker


SNOMED Code(s): 9314326


   ICD Code: Z87.891 - PERSONAL HISTORY OF NICOTINE DEPENDENCE   Status: Chronic

  Priority: Medium   Current Visit: No   





- Patient Summary/Data


Consults: 


                                  Consultations





12/02/21 10:06


Consult to Case Management/ [CONS] Routine 





12/02/21 13:30


Consult to Hospice [CONS] Routine 


Consult to Spiritual Care [CONS] Routine 











Labs Pending at D/C: 


None





Recommended Follow-up Testing/Procedures: 


Follow-up as needed for Comfort care/hospice





Hospital Course: 


This is a 71-year-old female admitted to the floor observation status overnight 

while hospice care is set up.  Patient does have a history of lung cancer which 

is now metastatic to her liver, brain, spine, and hips.  She reportedly 

discontinued cancer treatment this past June.  Patient has been very weak and 

having difficulty with ambulation.   has been at bedside.  Potassium was 

supplemented in the ED and patient was given a liter of IV fluid.  Hospice is 

reportedly accepted the patient at 1300 today with a plan discharge of noon.  

Overall her pain has been controlled.  She has reported some diarrhea and is 

receiving Imodium for this.  She has been DNR/DNI/comfort measures.  She will 

discharge back home today.  She has been prescribed 1-2 tabs of 5 mg oxycodone 

as needed for pain.  She is also been prescribed 1 mg Ativan as needed for 

anxiety and Imodium 2 mg as needed for diarrhea.  Her home Eliquis and home 

dexamethasone have been continued at patient request.  She was instructed to 

follow-up as needed for hospice care.  Discharge back home today with planned 

hospice admission.








- Patient Instructions


Diet: Usual Diet as Tolerated


Activity: As Tolerated


Driving: Do Not Drive


Showering/Bathing: May Shower


Notify Provider of: Fever, Increased Pain, Nausea and/or Vomiting


Other/Special Instructions: Follow-up as needed for hospice care





- Discharge Plan


*PRESCRIPTION DRUG MONITORING PROGRAM REVIEWED*: No


*COPY OF PRESCRIPTION DRUG MONITORING REPORT IN PATIENT ELOINA: No


Prescriptions/Med Rec: 


LORazepam [Ativan] 1 mg PO Q8H PRN #6 tablet


 PRN Reason: Anxiety 


Loperamide [Imodium] 2 mg PO Q4H PRN #12 cap


 PRN Reason: Diarrhea


oxyCODONE 5 - 10 mg PO Q4H PRN #12 tablet


 PRN Reason: Pain (Moderate 4-6)


Home Medications: 


                                    Home Meds





dexAMETHasone [Dexamethasone] 8 mg PO DAILY 11/14/21 [History]


Apixaban [Eliquis] 5 mg PO BID 12/02/21 [History]


LORazepam [Ativan] 1 mg PO Q8H PRN #6 tablet 12/03/21 [Rx]


Loperamide [Imodium] 2 mg PO Q4H PRN #12 cap 12/03/21 [Rx]


oxyCODONE 5 - 10 mg PO Q4H PRN #12 tablet 12/03/21 [Rx]








Oxygen Therapy Mode: Room Air


Forms:  ED Department Discharge


Referrals: 


Fritz Kaur MD [Primary Care Provider] -  (follow up with primary care 

provider as needed.)





- Discharge Summary/Plan Comment


DC Time >30 min.: No


Total # of Minutes for Discharge Time: 


29








- General Info


Date of Service: 12/03/21


Admission Dx/Problem (Free Text: 


Weakness





Functional Status: Reports: Pain Controlled, Tolerating Diet, Urinating.  

Denies: Ambulating, New Symptoms





- Review of Systems


General: Reports: Weakness, Fatigue, Malaise.  Denies: Fever, Chills


HEENT: Reports: No Symptoms.  Denies: Headaches, Sore Throat


Pulmonary: Reports: No Symptoms.  Denies: Shortness of Breath, Cough, Sputum, 

Wheezing


Cardiovascular: Reports: Edema.  Denies: Chest Pain, Palpitations, Dyspnea on 

Exertion


Gastrointestinal: Reports: Diarrhea.  Denies: Abdominal Pain, Constipation, 

Nausea, Vomiting


Genitourinary: Reports: No Symptoms.  Denies: Pain


Musculoskeletal: Reports: Leg Pain (right hip), Joint Pain (right hip)


Skin: Reports: No Symptoms.  Denies: Cyanosis


Neurological: Reports: Difficulty Walking, Weakness, Gait Disturbance.  Denies: 

Confusion, Headache, Numbness, Pre-Existing Deficit, Syncope, Tingling


Psychiatric: Reports: No Symptoms





- Patient Data


Vitals - Most Recent: 


                                Last Vital Signs











Temp  97.3 F   12/03/21 08:16


 


Pulse  100   12/03/21 08:16


 


Resp  24 H  12/03/21 08:16


 


BP  92/57 L  12/03/21 08:16


 


Pulse Ox  89 L  12/03/21 08:16











Weight - Most Recent: 133 lb 3.2 oz


I&O - Last 24 hours: 


                                 Intake & Output











 12/02/21 12/03/21 12/03/21





 22:59 06:59 14:59


 


Intake Total 1000  


 


Balance 1000  











Lab Results - Last 24 hrs: 


                         Laboratory Results - last 24 hr











  12/02/21 12/02/21 12/02/21 Range/Units





  07:55 07:55 11:16 


 


WBC  9.56    (3.98-10.04)  K/mm3


 


RBC  3.35 L    (3.98-5.22)  M/mm3


 


Hgb  11.4    (11.2-15.7)  gm/dl


 


Hct  36.1    (34.1-44.9)  %


 


MCV  107.8 H    (79.4-94.8)  fl


 


MCH  34.0 H    (25.6-32.2)  pg


 


MCHC  31.6 L    (32.2-35.5)  g/dl


 


RDW Std Deviation  65.5 H    (36.4-46.3)  fL


 


Plt Count  180 L    (182-369)  K/mm3


 


MPV  10.4    (9.4-12.3)  fl


 


Neut % (Auto)  80.3 H    (34.0-71.1)  %


 


Lymph % (Auto)  15.1 L    (19.3-51.7)  %


 


Mono % (Auto)  3.8 L    (4.7-12.5)  %


 


Eos % (Auto)  0 L    (0.7-5.8)  


 


Baso % (Auto)  0.1    (0.1-1.2)  %


 


Neut # (Auto)  7.68 H    (1.56-6.13)  K/mm3


 


Lymph # (Auto)  1.44    (1.18-3.74)  K/mm3


 


Mono # (Auto)  0.36    (0.24-0.36)  K/mm3


 


Eos # (Auto)  0.00 L    (0.04-0.36)  K/mm3


 


Baso # (Auto)  0.01    (0.01-0.08)  K/mm3


 


Sodium   144   (136-145)  mEq/L


 


Potassium   3.3 L   (3.5-5.1)  mEq/L


 


Chloride   107   ()  mEq/L


 


Carbon Dioxide   23   (21-32)  mEq/L


 


Anion Gap   17.3 H   (5-15)  


 


BUN   16   (7-18)  mg/dL


 


Creatinine   0.5 L   (0.55-1.02)  mg/dL


 


Est Cr Clr Drug Dosing   TNP   


 


Estimated GFR (MDRD)   > 60   (>60)  mL/min


 


BUN/Creatinine Ratio   32.0 H   (14-18)  


 


Glucose   105 H   (70-99)  mg/dL


 


Calcium   9.0   (8.5-10.1)  mg/dL


 


Total Bilirubin   0.9   (0.2-1.0)  mg/dL


 


AST   53 H   (15-37)  U/L


 


ALT   41   (14-59)  U/L


 


Alkaline Phosphatase   346 H   ()  U/L


 


Total Protein   5.5 L   (6.4-8.2)  g/dl


 


Albumin   2.3 L   (3.4-5.0)  g/dl


 


Globulin   3.2   gm/dL


 


Albumin/Globulin Ratio   0.7 L   (1-2)  


 


SARS-CoV-2 RNA (GINNA)    Negative  (NEGATIVE)  











Med Orders - Current: 


                               Current Medications





Acetaminophen (Acetaminophen 325 Mg Tab)  650 mg PO Q4H PRN


   PRN Reason: Pain (Mild 1-3)/fever


Apixaban (Apixaban 5 Mg Tab *** Patient's Own Med ***)  5 mg PO BID [facility]


   Last Admin: 12/03/21 08:15 Dose:  5 mg


   Documented by: 


Dexamethasone (Dexamethasone 4 Mg Tab)  8 mg PO DAILY [facility]


   Last Admin: 12/03/21 08:15 Dose:  8 mg


   Documented by: 


Hydromorphone HCl (Hydromorphone 0.5 Mg/0.5 Ml Syringe)  0.25 mg IVPUSH Q2H PRN


   PRN Reason: Pain (severe 7-10)


Loperamide HCl (Loperamide 2 Mg Cap)  2 mg PO Q4H PRN


   PRN Reason: Diarrhea


Lorazepam (Lorazepam 1 Mg Tab)  1 mg PO Q8H PRN


   PRN Reason: Anxiety 


Ondansetron HCl (Ondansetron 4 Mg/2 Ml Sdv)  4 mg IVPUSH Q6H PRN


   PRN Reason: Nausea


Oxycodone HCl (Oxycodone 5 Mg Tab)  10 mg PO Q4H PRN


   PRN Reason: Pain (moderate 4-6)





Discontinued Medications





Apixaban (Apixaban 5 Mg Tab  *** Patient's Own Med ***)  5 mg PO DAILY [facility]


Lactated Ringer's (Ringers, Lactated)  1,000 mls @ 999 mls/hr IV .BOLUS ONE


   Stop: 12/02/21 11:01


   Last Admin: 12/02/21 10:37 Dose:  999 mls/hr


   Documented by: 


Loperamide HCl (Loperamide 2 Mg Cap)  4 mg PO ONETIME ONE


   Stop: 12/03/21 09:38


Potassium Chloride (Potassium Chloride 20 Meq Tab.Er)  20 meq PO ONETIME ONE


   Stop: 12/02/21 12:42


   Last Admin: 12/02/21 15:38 Dose:  20 meq


   Documented by: 











- Exam


Quality Assessment: Denies: Supplemental Oxygen, Urine Catheter, DVT Prophylaxis


General: Reports: Alert, Oriented, Cooperative, No Acute Distress


HEENT: Reports: Pupils Equal, Pupils Reactive, Mucous Membr. Moist/Pink


Neck: Reports: Supple, Trachea Midline


Lungs: Reports: Clear to Auscultation, Normal Respiratory Effort


Cardiovascular: Reports: Regular Rate, Regular Rhythm


GI/Abdominal Exam: Normal Bowel Sounds, Soft, Non-Tender, No Distention


 (Female) Exam: Deferred


Rectal (Female) Exam: Deferred


Back Exam: Reports: Normal Inspection, Full Range of Motion


Extremities: Normal Inspection, Normal Range of Motion, Non-Tender, Normal 

Capillary Refill, Pedal Edema (1+ right, trace left)


Skin: Reports: Warm, Dry, Intact


Neurological: Reports: No New Focal Deficit


Psy/Mental Status: Reports: Alert, Normal Affect, Normal Mood





*Q Meaningful Use (DIS)





- VTE *Q


VTE Pharmacological Contraindications *Q: Tx/Proc Refused by Pt